# Patient Record
Sex: FEMALE | HISPANIC OR LATINO | Employment: UNEMPLOYED | RURAL
[De-identification: names, ages, dates, MRNs, and addresses within clinical notes are randomized per-mention and may not be internally consistent; named-entity substitution may affect disease eponyms.]

---

## 2021-02-13 PROBLEM — Z76.0 MEDICATION REFILL: Status: ACTIVE | Noted: 2021-02-13

## 2021-02-13 PROBLEM — J98.9 RESPIRATORY ILLNESS: Status: ACTIVE | Noted: 2021-02-13

## 2021-02-13 PROBLEM — G43.109 MIGRAINE EQUIVALENT: Status: ACTIVE | Noted: 2021-02-13

## 2021-02-13 PROCEDURE — 87109 MYCOPLASMA: CPT | Performed by: NURSE PRACTITIONER

## 2021-02-13 PROCEDURE — 87635 SARS-COV-2 COVID-19 AMP PRB: CPT | Performed by: NURSE PRACTITIONER

## 2021-02-18 ENCOUNTER — TELEPHONE (OUTPATIENT)
Dept: FAMILY MEDICINE CLINIC | Facility: CLINIC | Age: 41
End: 2021-02-18

## 2021-02-18 NOTE — TELEPHONE ENCOUNTER
CALLED TO CANCEL/R/S DUE TO WEATHER PLUS CANT DO NEW PATIENT APPTS OVER THE PHONE..CALLED    NA/NVM  HUB TO READ

## 2021-02-22 ENCOUNTER — TRANSCRIBE ORDERS (OUTPATIENT)
Dept: URGENT CARE | Facility: CLINIC | Age: 41
End: 2021-02-22

## 2021-02-22 DIAGNOSIS — N34.1 NONGONOCOCCAL URETHRITIS DUE TO UREAPLASMA UREALYTICUM: Primary | ICD-10-CM

## 2021-02-22 DIAGNOSIS — A49.3 NONGONOCOCCAL URETHRITIS DUE TO UREAPLASMA UREALYTICUM: Primary | ICD-10-CM

## 2021-02-22 RX ORDER — AZITHROMYCIN 500 MG/1
1000 TABLET, FILM COATED ORAL ONCE
Qty: 2 TABLET | Refills: 0 | Status: SHIPPED | OUTPATIENT
Start: 2021-02-22 | End: 2021-02-22

## 2021-03-03 ENCOUNTER — OFFICE VISIT (OUTPATIENT)
Dept: FAMILY MEDICINE CLINIC | Facility: CLINIC | Age: 41
End: 2021-03-03

## 2021-03-03 VITALS — WEIGHT: 125.6 LBS | TEMPERATURE: 98 F | BODY MASS INDEX: 21.44 KG/M2 | HEIGHT: 64 IN

## 2021-03-03 DIAGNOSIS — M54.2 CHRONIC NECK PAIN: ICD-10-CM

## 2021-03-03 DIAGNOSIS — Z12.31 SCREENING MAMMOGRAM, ENCOUNTER FOR: ICD-10-CM

## 2021-03-03 DIAGNOSIS — Z00.00 GENERAL MEDICAL EXAMINATION: ICD-10-CM

## 2021-03-03 DIAGNOSIS — Z72.0 TOBACCO USER: ICD-10-CM

## 2021-03-03 DIAGNOSIS — Z13.6 ENCOUNTER FOR SCREENING FOR CARDIOVASCULAR DISORDERS: ICD-10-CM

## 2021-03-03 DIAGNOSIS — G89.29 CHRONIC MIDLINE BACK PAIN, UNSPECIFIED BACK LOCATION: ICD-10-CM

## 2021-03-03 DIAGNOSIS — M25.511 RIGHT SHOULDER PAIN, UNSPECIFIED CHRONICITY: Primary | ICD-10-CM

## 2021-03-03 DIAGNOSIS — F10.10 ALCOHOL ABUSE: ICD-10-CM

## 2021-03-03 DIAGNOSIS — Z13.1 ENCOUNTER FOR SCREENING FOR DIABETES MELLITUS: ICD-10-CM

## 2021-03-03 DIAGNOSIS — Z00.00 ANNUAL PHYSICAL EXAM: ICD-10-CM

## 2021-03-03 DIAGNOSIS — Z01.419 ENCOUNTER FOR CERVICAL PAP SMEAR WITH PELVIC EXAM: ICD-10-CM

## 2021-03-03 DIAGNOSIS — M54.9 CHRONIC MIDLINE BACK PAIN, UNSPECIFIED BACK LOCATION: ICD-10-CM

## 2021-03-03 DIAGNOSIS — E03.9 HYPOTHYROIDISM, UNSPECIFIED TYPE: ICD-10-CM

## 2021-03-03 DIAGNOSIS — G89.29 CHRONIC NECK PAIN: ICD-10-CM

## 2021-03-03 DIAGNOSIS — G43.109 MIGRAINE EQUIVALENT: ICD-10-CM

## 2021-03-03 DIAGNOSIS — Z71.6 TOBACCO ABUSE COUNSELING: ICD-10-CM

## 2021-03-03 PROCEDURE — 99204 OFFICE O/P NEW MOD 45 MIN: CPT | Performed by: FAMILY MEDICINE

## 2021-03-03 RX ORDER — TOPIRAMATE 25 MG/1
25 TABLET ORAL 2 TIMES DAILY
Qty: 60 TABLET | Refills: 3 | Status: SHIPPED | OUTPATIENT
Start: 2021-03-03 | End: 2021-03-31 | Stop reason: SDUPTHER

## 2021-03-03 RX ORDER — NICOTINE 21 MG/24HR
1 PATCH, TRANSDERMAL 24 HOURS TRANSDERMAL EVERY 24 HOURS
Qty: 30 PATCH | Refills: 3 | Status: SHIPPED | OUTPATIENT
Start: 2021-03-03 | End: 2021-03-31 | Stop reason: SDUPTHER

## 2021-03-03 RX ORDER — MELOXICAM 15 MG/1
15 TABLET ORAL DAILY
Qty: 30 TABLET | Refills: 3 | Status: SHIPPED | OUTPATIENT
Start: 2021-03-03 | End: 2021-03-31 | Stop reason: SDUPTHER

## 2021-03-03 RX ORDER — CYCLOBENZAPRINE HCL 5 MG
5 TABLET ORAL 3 TIMES DAILY PRN
Qty: 30 TABLET | Refills: 3 | Status: SHIPPED | OUTPATIENT
Start: 2021-03-03 | End: 2021-03-31 | Stop reason: SDUPTHER

## 2021-03-03 RX ORDER — BUTALBITAL, ACETAMINOPHEN AND CAFFEINE 50; 325; 40 MG/1; MG/1; MG/1
1 TABLET ORAL EVERY 6 HOURS PRN
Qty: 10 TABLET | Refills: 0 | Status: SHIPPED | OUTPATIENT
Start: 2021-03-03 | End: 2021-03-31 | Stop reason: SDUPTHER

## 2021-03-03 RX ORDER — KETOROLAC TROMETHAMINE 10 MG/1
10 TABLET, FILM COATED ORAL EVERY 6 HOURS PRN
Qty: 30 TABLET | Refills: 3 | Status: SHIPPED | OUTPATIENT
Start: 2021-03-03 | End: 2021-03-03

## 2021-03-03 NOTE — PATIENT INSTRUCTIONS
New medications    Stop toradol     Start on mobic 15 mg daily for headaches and back and neck pain  Flexeril 5 mg every  8 hours for back and neck pain  For headaches topamax 25 mg twice a day to prevent headaches  fioricet on onset of headaches    Get labwork and x-rays downstairs down elevator to the right    Stop by physical therapy near front door to schedule appt with back and neck    Recheck in 4 weeks       Topiramate tablets  What is this medicine?  TOPIRAMATE (toe PYRE a mate) is used to treat seizures in adults or children with epilepsy. It is also used for the prevention of migraine headaches.  This medicine may be used for other purposes; ask your health care provider or pharmacist if you have questions.  COMMON BRAND NAME(S): Topamax, Topiragen  What should I tell my health care provider before I take this medicine?  They need to know if you have any of these conditions:  · bleeding disorders  · kidney disease  · lung or breathing disease, like asthma  · suicidal thoughts, plans, or attempt; a previous suicide attempt by you or a family member  · an unusual or allergic reaction to topiramate, other medicines, foods, dyes, or preservatives  · pregnant or trying to get pregnant  · breast-feeding  How should I use this medicine?  Take this medicine by mouth with a glass of water. Follow the directions on the prescription label. Do not cut, crush or chew this medicine. Swallow the tablets whole. You can take it with or without food. If it upsets your stomach, take it with food. Take your medicine at regular intervals. Do not take it more often than directed. Do not stop taking except on your doctor's advice.  A special MedGuide will be given to you by the pharmacist with each prescription and refill. Be sure to read this information carefully each time.  Talk to your pediatrician regarding the use of this medicine in children. While this drug may be prescribed for children as young as 2 years of age for  selected conditions, precautions do apply.  Overdosage: If you think you have taken too much of this medicine contact a poison control center or emergency room at once.  NOTE: This medicine is only for you. Do not share this medicine with others.  What if I miss a dose?  If you miss a dose, take it as soon as you can. If your next dose is to be taken in less than 6 hours, then do not take the missed dose. Take the next dose at your regular time. Do not take double or extra doses.  What may interact with this medicine?  This medicine may interact with the following medications:  · acetazolamide  · alcohol  · antihistamines for allergy, cough, and cold  · aspirin and aspirin-like medicines  · atropine  · birth control pills  · certain medicines for anxiety or sleep  · certain medicines for bladder problems like oxybutynin, tolterodine  · certain medicines for depression like amitriptyline, fluoxetine, sertraline  · certain medicines for seizures like carbamazepine, phenobarbital, phenytoin, primidone, valproic acid, zonisamide  · certain medicines for stomach problems like dicyclomine, hyoscyamine  · certain medicines for travel sickness like scopolamine  · certain medicines for Parkinson's disease like benztropine, trihexyphenidyl  · certain medicines that treat or prevent blood clots like warfarin, enoxaparin, dalteparin, apixaban, dabigatran, and rivaroxaban  · digoxin  · general anesthetics like halothane, isoflurane, methoxyflurane, propofol  · hydrochlorothiazide  · ipratropium  · lithium  · medicines that relax muscles for surgery  · metformin  · narcotic medicines for pain  · NSAIDs, medicines for pain and inflammation, like ibuprofen or naproxen  · phenothiazines like chlorpromazine, mesoridazine, prochlorperazine, thioridazine  · pioglitazone  This list may not describe all possible interactions. Give your health care provider a list of all the medicines, herbs, non-prescription drugs, or dietary supplements  you use. Also tell them if you smoke, drink alcohol, or use illegal drugs. Some items may interact with your medicine.  What should I watch for while using this medicine?  Visit your doctor or health care professional for regular checks on your progress. Tell your health care professional if your symptoms do not start to get better or if they get worse.  Do not stop taking except on your health care professional's advice. You may develop a severe reaction. Your health care professional will tell you how much medicine to take.  Wear a medical ID bracelet or chain. Carry a card that describes your disease and details of your medicine and dosage times.  This medicine can reduce the response of your body to heat or cold. Dress warm in cold weather and stay hydrated in hot weather. If possible, avoid extreme temperatures like saunas, hot tubs, very hot or cold showers, or activities that can cause dehydration such as vigorous exercise.  Check with your health care professional if you have severe diarrhea, nausea, and vomiting, or if you sweat a lot. The loss of too much body fluid may make it dangerous for you to take this medicine.  You may get drowsy or dizzy. Do not drive, use machinery, or do anything that needs mental alertness until you know how this medicine affects you. Do not stand up or sit up quickly, especially if you are an older patient. This reduces the risk of dizzy or fainting spells. Alcohol may interfere with the effect of this medicine. Avoid alcoholic drinks.  Tell your health care professional right away if you have any change in your eyesight.  Patients and their families should watch out for new or worsening depression or thoughts of suicide. Also watch out for sudden changes in feelings such as feeling anxious, agitated, panicky, irritable, hostile, aggressive, impulsive, severely restless, overly excited and hyperactive, or not being able to sleep. If this happens, especially at the beginning of  treatment or after a change in dose, call your healthcare professional.  This medicine may cause serious skin reactions. They can happen weeks to months after starting the medicine. Contact your health care provider right away if you notice fevers or flu-like symptoms with a rash. The rash may be red or purple and then turn into blisters or peeling of the skin. Or, you might notice a red rash with swelling of the face, lips or lymph nodes in your neck or under your arms.  Birth control may not work properly while you are taking this medicine. Talk to your health care professional about using an extra method of birth control.  Women should inform their health care professional if they wish to become pregnant or think they might be pregnant. There is a potential for serious side effects and harm to an unborn child. Talk to your health care professional for more information.  What side effects may I notice from receiving this medicine?  Side effects that you should report to your doctor or health care professional as soon as possible:  · allergic reactions like skin rash, itching or hives, swelling of the face, lips, or tongue  · blood in the urine  · changes in vision  · confusion  · loss of memory  · pain in lower back or side  · pain when urinating  · redness, blistering, peeling or loosening of the skin, including inside the mouth  · signs and symptoms of bleeding such as bloody or black, tarry stools; red or dark brown urine; spitting up blood or brown material that looks like coffee grounds; red spots on the skin; unusual bruising or bleeding from the eyes, gums, or nose  · signs and symptoms of increased acid in the body like breathing fast; fast heartbeat; headache; confusion; unusually weak or tired; nausea, vomiting  · suicidal thoughts, mood changes  · trouble speaking or understanding  · unusual sweating  · unusually weak or tired  Side effects that usually do not require medical attention (report to your  doctor or health care professional if they continue or are bothersome):  · dizziness  · drowsiness  · fever  · loss of appetite  · nausea, vomiting  · pain, tingling, numbness in the hands or feet  · stomach pain  · tiredness  · upset stomach  This list may not describe all possible side effects. Call your doctor for medical advice about side effects. You may report side effects to FDA at 7-823-FSB-0739.  Where should I keep my medicine?  Keep out of the reach of children.  Store at room temperature between 15 and 30 degrees C (59 and 86 degrees F). Throw away any unused medicine after the expiration date.  NOTE: This sheet is a summary. It may not cover all possible information. If you have questions about this medicine, talk to your doctor, pharmacist, or health care provider.  © 2021 [employer] Standard (2020-07-16 15:07:20)  Butalbital; Acetaminophen; Caffeine; Codeine Capsules  What is this medicine?  BUTALBITAL; ACETAMINOPHEN; CAFFEINE; CODEINE (byfrannie rodriguez giovanni; a set a DOUGLAS angie fen; AARON een; CARLOS urvashi) is a pain reliever. It is used to treat tension headaches.  This medicine may be used for other purposes; ask your health care provider or pharmacist if you have questions.  COMMON BRAND NAME(S): Fioricet with Codeine, Phrenilin with Caffeine and Codeine  What should I tell my health care provider before I take this medicine?  They need to know if you have any of these conditions:  · breathing problems  · drug abuse or addiction  · if you often drink alcohol  · liver disease  · intestinal problem  · porphyria  · recent head injury  · an unusual or allergic reaction to acetaminophen, butalbital or other barbiturates, caffeine, codeine, other medicines, foods, dyes, or preservatives  · pregnant or trying to get pregnant  · breast-feeding  How should I use this medicine?  Take this medicine by mouth with a full glass of water. Follow the directions on the prescription label. Do not take your medicine more often  than directed.  A special MedGuide will be given to you by the pharmacist with each prescription and refill. Be sure to read this information carefully each time.  Talk to your pediatrician regarding the use of this medicine in children. This medicine is not for use in children less than 12 years of age. Do not give this medicine to a child younger than 18 years of age after surgery to remove the tonsils and/or adenoids.  Patients over 65 years old may have a stronger reaction and need a smaller dose.  Overdosage: If you think you have taken too much of this medicine contact a poison control center or emergency room at once.  NOTE: This medicine is only for you. Do not share this medicine with others.  What if I miss a dose?  If you miss a dose, take it as soon as you can. If it is almost time for your next dose, take only that dose. Do not take double or extra doses.  What may interact with this medicine?  Do not take this medicine with any of the following medications:  · praziquantel  · voriconazole  This medicine may also interact with the following medications:  · alcohol  · antihistamines for allergy, cough and cold  · certain medicines for anxiety or sleep  · certain medicines for bladder problems like oxybutynin, tolterodine  · certain medicines for depression like amitriptyline, fluoxetine, sertraline  · certain medicines for Parkinson's disease like benztropine, trihexyphenidyl  · certain medicines for seizures like phenobarbital, primidone  · certain medicines for stomach problems like dicyclomine, hyoscyamine  · certain medicines for travel sickness like scopolamine  · general anesthetics like halothane, isoflurane, methoxyflurane, propofol  · ipratropium  · linezolid  · local anesthetics like lidocaine, pramoxine, tetracaine  · MAOIs like Carbex, Eldepryl, Marplan, Nardil, and Parnate  · medicines that relax muscles for surgery  · other medicines with acetaminophen  · other narcotic medicines for pain  or cough  · phenothiazines like chlorpromazine, mesoridazine, prochlorperazine, thioridazine  · procarbazine  This list may not describe all possible interactions. Give your health care provider a list of all the medicines, herbs, non-prescription drugs, or dietary supplements you use. Also tell them if you smoke, drink alcohol, or use illegal drugs. Some items may interact with your medicine.  What should I watch for while using this medicine?  Tell your health care provider if your pain does not go away, if it gets worse, or if you have new or a different type of pain. You may develop tolerance to this drug. Tolerance means that you will need a higher dose of the drug for pain relief. Tolerance is normal and is expected if you take this drug for a long time.  There are different types of narcotic drugs (opioids) for pain. If you take more than one type at the same time, you may have more side effects. Give your health care provider a list of all drugs you use. He or she will tell you how much drug to take. Do not take more drug than directed. Get emergency help right away if you have problems breathing.  Do not suddenly stop taking your drug because you may develop a severe reaction. Your body becomes used to the drug. This does NOT mean you are addicted. Addiction is a behavior related to getting and using a drug for a nonmedical reason. If you have pain, you have a medical reason to take pain drug. Your health care provider will tell you how much drug to take. If your health care provider wants you to stop the drug, the dose will be slowly lowered over time to avoid any side effects.  Talk to your health care provider about naloxone and how to get it. Naloxone is an emergency drug used for an opioid overdose. An overdose can happen if you take too much opioid. It can also happen if an opioid is taken with some other drugs or substances, like alcohol. Know the symptoms of an overdose, like trouble breathing,  unusually tired or sleepy, or not being able to respond or wake up. Make sure to tell caregivers and close contacts where it is stored. Make sure they know how to use it. After naloxone is given, you must get emergency help right away. Naloxone is a temporary treatment. Repeat doses may be needed.  Children may be at higher risk for side effects. If your child has slow breathing, noisy breathing, confusion, or unusual sleepiness, stop giving this drug and get emergency help right away.  Do not take other drugs that contain acetaminophen with this drug. Many non-prescription drugs contain acetaminophen. Always read labels carefully. If you have questions, ask your health care provider.  If you take too much acetaminophen, get medical help right away. Too much acetaminophen can be very dangerous and cause liver damage. Even if you do not have symptoms, it is important to get help right away.  You may get drowsy or dizzy. Do not drive, use machinery, or do anything that needs mental alertness until you know how this drug affects you. Do not stand up or sit up quickly, especially if you are an older patient. This reduces the risk of dizzy or fainting spells. Alcohol may interfere with the effect of this drug. Avoid alcoholic drinks.  This drug will cause constipation. If you do not have a bowel movement for 3 days, call your health care provider.  Your mouth may get dry. Chewing sugarless gum or sucking hard candy and drinking plenty of water may help. Contact your health care provider if the problem does not go away or is severe.  What side effects may I notice from receiving this medicine?  Side effects that you should report to your doctor or health care professional as soon as possible:  · allergic reactions like skin rash, itching or hives, swelling of the face, lips, or tongue  · anxious  · breathing problems  · confusion  · fast, irregular heartbeat  · redness, blistering, peeling or loosening of the skin,  including inside the mouth  · seizures  · signs and symptoms of low blood pressure like dizziness; feeling faint or lightheaded, falls; unusually weak or tired  · trouble passing urine or change in the amount of urine  · yellowing of the eyes or skin  Side effects that usually do not require medical attention (report to your doctor or health care professional if they continue or are bothersome):  · constipation  · dry mouth  · nausea, vomiting  · tiredness  This list may not describe all possible side effects. Call your doctor for medical advice about side effects. You may report side effects to FDA at 5-049-IKD-8310.  Where should I keep my medicine?  Keep out of the reach of children. This medicine can be abused. Keep your medicine in a safe place to protect it from theft. Do not share this medicine with anyone. Selling or giving away this medicine is dangerous and against the law.  This medicine may cause accidental overdose and death if it taken by other adults, children, or pets. Mix any unused medicine with a substance like cat litter or coffee grounds. Then throw the medicine away in a sealed container like a sealed bag or a coffee can with a lid. Do not use the medicine after the expiration date.  Store at room temperature between 15 and 30 degrees C (59 and 86 degrees F).  NOTE: This sheet is a summary. It may not cover all possible information. If you have questions about this medicine, talk to your doctor, pharmacist, or health care provider.  © 2021 [employer]/Gold Standard (2020-07-30 15:09:07)

## 2021-03-04 ENCOUNTER — TELEPHONE (OUTPATIENT)
Dept: FAMILY MEDICINE CLINIC | Facility: CLINIC | Age: 41
End: 2021-03-04

## 2021-03-04 NOTE — TELEPHONE ENCOUNTER
----- Message from Rashad Rondon MD sent at 3/3/2021  6:48 PM CST -----  Please let pt know she has mild levoscoliosis or curvature of lumbar spine that may be causing lower back pain    Has inward curve of lumbar spine that may be causing back pain as well    Continue with PT/OT and medications provided    Recheck on next visit. Thanks

## 2021-03-04 NOTE — TELEPHONE ENCOUNTER
----- Message from Rashad Rondon MD sent at 3/3/2021  6:46 PM CST -----  Please let pt know she has degenerative disc disease along with spondylotic changes or shifting  of cervical spine that may be causing her neck pain in addition pt has cervical lordosis or straightening of neck due to muscle spasms    Proceed with PT/OT and medications provided     Recheck on next visit. Thanks

## 2021-03-04 NOTE — TELEPHONE ENCOUNTER
----- Message from Rashad Rondon MD sent at 3/3/2021  6:44 PM CST -----  Please let pt know she has normal right shoulder x-ray. She does have scoliosis or curvature of thoracic spine that may be causing her middle back pian    Recheck on next visit. Thanks

## 2021-03-04 NOTE — TELEPHONE ENCOUNTER
----- Message from Rashad Rondon MD sent at 3/3/2021  6:49 PM CST -----  Please let pt know x-ray of lumbar and thoracic spine shows mild scoliosis or curvature that may be contributing to pt's lower back pain    Proceed with PT/OT     Recheck on next visit. Thanks

## 2021-03-15 NOTE — PROGRESS NOTES
Subjective:  Lester Lozano is a 41 y.o. female who presents for       Patient Active Problem List   Diagnosis   • Medication refill   • Migraine equivalent   • Respiratory illness   • Hypothyroidism   • Tobacco abuse counseling   • Tobacco user   • Thoracogenic scoliosis of thoracic region   • Chronic midline thoracic back pain   • Scoliosis of lumbar spine   • Chronic midline low back pain with bilateral sciatica   • Neck pain, chronic   • Migraine   • DDD (degenerative disc disease), cervical   • Restless leg syndrome   • Chronic midline back pain   • Chronic neck pain           Current Outpatient Medications:   •  butalbital-acetaminophen-caffeine (Esgic) -40 MG per tablet, Take 1 tablet by mouth Every 6 (Six) Hours As Needed for Headache or Migraine., Disp: 10 tablet, Rfl: 0  •  cyclobenzaprine (FLEXERIL) 5 MG tablet, Take 1 tablet by mouth 3 (Three) Times a Day As Needed for Muscle Spasms., Disp: 30 tablet, Rfl: 3  •  levothyroxine (SYNTHROID, LEVOTHROID) 125 MCG tablet, Take 1 tablet by mouth Daily., Disp: 30 tablet, Rfl: 3  •  meloxicam (Mobic) 15 MG tablet, Take 1 tablet by mouth Daily., Disp: 30 tablet, Rfl: 3  •  nicotine (Nicoderm CQ) 21 MG/24HR patch, Place 1 patch on the skin as directed by provider Daily., Disp: 30 patch, Rfl: 3  •  topiramate (Topamax) 25 MG tablet, Take 1 tablet by mouth 2 (Two) Times a Day., Disp: 60 tablet, Rfl: 3  •  rOPINIRole (Requip) 0.5 MG tablet, Take 1 tablet by mouth Every Night. Take 1 hour before bedtime., Disp: 30 tablet, Rfl: 3    Pt is 40 yo female with management of hypothyroidism, tobacco user, migraines, chronic back pain (scoliosis of lumbar, thoracic spine), chronic neck pain (DDD cervical spine, spondylosis)insomnia     3/3/21  pt is here to [place].  She is from [place] and has lived in [place] prior to moving back her to live closer to mom who is having health issues..  She has a CMH of hypothyroidism and is on synthroid 100 mcg PO q  artur. . pt was recently seen at   For URI n 2/13/21 and had COVID 19 test that was negative along with influenza test negative. She was given keflex. Pt does take toradol for migraine headaches.  She was diagnosed with hypothyroidism while she was pregnant. She has 3 biological children and 1 passed away.   She is  and is single. She is trying to get work. She smoke tobaco about 1 ppd and wants to quit. No alcohol use   No illicit drug use. She currently lives with mother and stepfather. She also has had no major surgeries.. 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 She has pain in her right scapula as well. She has been having neck and back pain for years and was taking toradol but it is getting progressively worse. She also takes fioricet for migraine headaches and it is all over. She has 5 headaches about a month. She has tried imitrex that does not help. She also has right shoulder pain. She feels like she has her shoulder come of socket.     3/31/21 in office visit for recheck on pt's above medical issues. Pt has yet to get labwork ordered on 3/3/21. Pt on last visit was having issues of lower and middle back pain along with neck pain.  X-ray of thoracic spine on 3/3/21 showed minimal levoscoliosis of upper thoracic spine and minimal dextroscoliosis of lower thoracic spine.  Along with mild degenerative changes. X-ray of lumbar spine showed mild levoscoliosis with congenitally short pedicles.  X-ray of cervical spine showed straigetning of cervical lordosis and DDD of cervical spine at C5-C6.  X-ray of normal right shoulder. She continues to take her medications for her back pain, migraine headaches and hypothyroidism. She also has restless legs at bedtime that affect her sleep     Hypothyroidism  The current episode started more than 1 year ago. The problem has been unchanged. Associated symptoms include arthralgias, fatigue, neck pain, numbness and weakness. Pertinent negatives  include no abdominal pain, anorexia, chest pain, chills, congestion, coughing, diaphoresis, fever, headaches, nausea, sore throat, swollen glands, visual change or vomiting. Nothing aggravates the symptoms. She has tried nothing (synthroid ) for the symptoms. The treatment provided no relief.   Headache   This is a chronic problem. The current episode started more than 1 year ago. The problem occurs constantly. The problem has been unchanged. The pain is at a severity of 4/10. The patient is experiencing no pain. Associated symptoms include back pain, neck pain, numbness, scalp tenderness and weakness. Pertinent negatives include no abdominal pain, abnormal behavior, anorexia, blurred vision, coughing, dizziness, drainage, ear pain, facial sweating, fever, hearing loss, insomnia, loss of balance, muscle aches, nausea, photophobia, rhinorrhea, seizures, sinus pressure, sore throat, swollen glands, tingling, tinnitus, visual change or vomiting. Nothing aggravates the symptoms. Treatments tried: toradol    Neck Pain   This is a chronic problem. The current episode started more than 1 year ago. The problem occurs constantly. The problem has been unchanged. The pain is associated with nothing. The pain is present in the anterior neck, left side and midline. The quality of the pain is described as aching. The pain is at a severity of 0/10. The patient is experiencing no pain. Nothing aggravates the symptoms. The pain is worse during the night. Stiffness is present all day. Associated symptoms include numbness and weakness. Pertinent negatives include no chest pain, fever, headaches, pain with swallowing, paresis, photophobia, tingling, trouble swallowing or visual change. The treatment provided no relief.   Back Pain  This is a chronic problem. The current episode started more than 1 year ago. The problem occurs constantly. The pain is present in the lumbar spine, sacro-iliac and thoracic spine. The quality of the pain is  described as aching and burning. The pain is at a severity of 5/10. The pain is moderate. The pain is the same all the time. The symptoms are aggravated by bending, lying down, position and standing. Stiffness is present all day. Associated symptoms include numbness and weakness. Pertinent negatives include no abdominal pain, chest pain, fever, headaches, paresis or tingling. She has tried nothing for the symptoms. The treatment provided no relief.   Shoulder Injury   The incident occurred at the gym. The right shoulder is affected. There was no injury mechanism. The quality of the pain is described as aching, burning and cramping. The pain does not radiate. Associated symptoms include numbness. Pertinent negatives include no chest pain or tingling. The symptoms are aggravated by movement and overhead lifting. She has tried nothing for the symptoms. The treatment provided no relief.      Review of Systems  Review of Systems   Constitutional: Positive for activity change and fatigue. Negative for appetite change, chills, diaphoresis and fever.   HENT: Negative for congestion, ear pain, hearing loss, postnasal drip, rhinorrhea, sinus pressure, sinus pain, sneezing, sore throat, tinnitus, trouble swallowing and voice change.    Eyes: Negative for blurred vision and photophobia.   Respiratory: Negative for cough, choking, chest tightness, shortness of breath, wheezing and stridor.    Cardiovascular: Negative for chest pain.   Gastrointestinal: Negative for abdominal pain, anorexia, diarrhea, nausea and vomiting.   Musculoskeletal: Positive for arthralgias, back pain, neck pain and neck stiffness.   Neurological: Positive for weakness and numbness. Negative for dizziness, tingling, seizures, headaches and loss of balance.   Psychiatric/Behavioral: Positive for sleep disturbance. The patient does not have insomnia.        Patient Active Problem List   Diagnosis   • Medication refill   • Migraine equivalent   • Respiratory  illness   • Hypothyroidism   • Tobacco abuse counseling   • Tobacco user   • Thoracogenic scoliosis of thoracic region   • Chronic midline thoracic back pain   • Scoliosis of lumbar spine   • Chronic midline low back pain with bilateral sciatica   • Neck pain, chronic   • Migraine   • DDD (degenerative disc disease), cervical   • Restless leg syndrome   • Chronic midline back pain   • Chronic neck pain     History reviewed. No pertinent surgical history.  Social History     Socioeconomic History   • Marital status:      Spouse name: Not on file   • Number of children: Not on file   • Years of education: Not on file   • Highest education level: Not on file   Tobacco Use   • Smoking status: Current Every Day Smoker   • Smokeless tobacco: Never Used   Substance and Sexual Activity   • Alcohol use: Not Currently   • Drug use: Not Currently   • Sexual activity: Defer     History reviewed. No pertinent family history.  Lab on 03/22/2021   Component Date Value Ref Range Status   • WBC 03/22/2021 6.61  3.40 - 10.80 10*3/mm3 Final   • RBC 03/22/2021 4.41  3.77 - 5.28 10*6/mm3 Final   • Hemoglobin 03/22/2021 12.5  12.0 - 15.9 g/dL Final   • Hematocrit 03/22/2021 38.2  34.0 - 46.6 % Final   • MCV 03/22/2021 86.6  79.0 - 97.0 fL Final   • MCH 03/22/2021 28.3  26.6 - 33.0 pg Final   • MCHC 03/22/2021 32.7  31.5 - 35.7 g/dL Final   • RDW 03/22/2021 13.4  12.3 - 15.4 % Final   • RDW-SD 03/22/2021 41.7  37.0 - 54.0 fl Final   • MPV 03/22/2021 10.1  6.0 - 12.0 fL Final   • Platelets 03/22/2021 410  140 - 450 10*3/mm3 Final   • Neutrophil % 03/22/2021 49.0  42.7 - 76.0 % Final   • Lymphocyte % 03/22/2021 43.6  19.6 - 45.3 % Final   • Monocyte % 03/22/2021 6.5  5.0 - 12.0 % Final   • Eosinophil % 03/22/2021 0.0* 0.3 - 6.2 % Final   • Basophil % 03/22/2021 0.6  0.0 - 1.5 % Final   • Immature Grans % 03/22/2021 0.3  0.0 - 0.5 % Final   • Neutrophils, Absolute 03/22/2021 3.24  1.70 - 7.00 10*3/mm3 Final   • Lymphocytes, Absolute  03/22/2021 2.88  0.70 - 3.10 10*3/mm3 Final   • Monocytes, Absolute 03/22/2021 0.43  0.10 - 0.90 10*3/mm3 Final   • Eosinophils, Absolute 03/22/2021 0.00  0.00 - 0.40 10*3/mm3 Final   • Basophils, Absolute 03/22/2021 0.04  0.00 - 0.20 10*3/mm3 Final   • Immature Grans, Absolute 03/22/2021 0.02  0.00 - 0.05 10*3/mm3 Final   • nRBC 03/22/2021 0.0  0.0 - 0.2 /100 WBC Final   • Glucose 03/22/2021 93  65 - 99 mg/dL Final   • BUN 03/22/2021 12  6 - 20 mg/dL Final   • Creatinine 03/22/2021 0.61  0.57 - 1.00 mg/dL Final   • Sodium 03/22/2021 141  136 - 145 mmol/L Final   • Potassium 03/22/2021 3.9  3.5 - 5.2 mmol/L Final   • Chloride 03/22/2021 106  98 - 107 mmol/L Final   • CO2 03/22/2021 25.7  22.0 - 29.0 mmol/L Final   • Calcium 03/22/2021 8.7  8.6 - 10.5 mg/dL Final   • Total Protein 03/22/2021 7.3  6.0 - 8.5 g/dL Final   • Albumin 03/22/2021 4.20  3.50 - 5.20 g/dL Final   • ALT (SGPT) 03/22/2021 14  1 - 33 U/L Final   • AST (SGOT) 03/22/2021 19  1 - 32 U/L Final   • Alkaline Phosphatase 03/22/2021 48  39 - 117 U/L Final   • Total Bilirubin 03/22/2021 0.2  0.0 - 1.2 mg/dL Final   • eGFR Non African Amer 03/22/2021 109  >60 mL/min/1.73 Final   • eGFR   Amer 03/22/2021 132  >60 mL/min/1.73 Final   • Globulin 03/22/2021 3.1  gm/dL Final   • A/G Ratio 03/22/2021 1.4  g/dL Final   • BUN/Creatinine Ratio 03/22/2021 19.7  7.0 - 25.0 Final   • Anion Gap 03/22/2021 9.3  5.0 - 15.0 mmol/L Final   • Hemoglobin A1C 03/22/2021 5.51  4.80 - 5.60 % Final   • Total Cholesterol 03/22/2021 141  0 - 200 mg/dL Final   • Triglycerides 03/22/2021 99  0 - 150 mg/dL Final   • HDL Cholesterol 03/22/2021 68* 40 - 60 mg/dL Final   • LDL Cholesterol  03/22/2021 55  0 - 100 mg/dL Final   • VLDL Cholesterol 03/22/2021 18  5 - 40 mg/dL Final   • LDL/HDL Ratio 03/22/2021 0.78   Final   • TSH 03/22/2021 21.600* 0.270 - 4.200 uIU/mL Final   • Free T4 03/22/2021 0.75* 0.93 - 1.70 ng/dL Final   • 25 Hydroxy, Vitamin D 03/22/2021 46.9  30.0 - 100.0  ng/ml Final   • Hepatitis C Ab 03/22/2021 Non-Reactive  Non-Reactive Final   • Thyroid Peroxidase Antibody 03/22/2021 >600* 0 - 34 IU/mL Final   • Thyroid Stimulating Immunoglobulin 03/22/2021 <0.10  0.00 - 0.55 IU/L Final   • T3, Total 03/22/2021 81.5  80.0 - 200.0 ng/dl Final   Admission on 02/13/2021, Discharged on 02/13/2021   Component Date Value Ref Range Status   • Color 02/13/2021 Yellow  Yellow, Straw, Dark Yellow, Va Final   • Clarity, UA 02/13/2021 Clear  Clear Final   • Glucose, UA 02/13/2021 Negative  Negative, 1000 mg/dL (3+) mg/dL Final   • Bilirubin 02/13/2021 Negative  Negative Final   • Ketones, UA 02/13/2021 Negative  Negative Final   • Specific Gravity  02/13/2021 1.015  1.005 - 1.030 Final   • Blood, UA 02/13/2021 Negative  Negative Final   • pH, Urine 02/13/2021 7.5  5.0 - 8.0 Final   • Protein, POC 02/13/2021 Negative  Negative mg/dL Final   • Urobilinogen, UA 02/13/2021 4 E.U./dL * Normal Final   • Nitrite, UA 02/13/2021 Negative  Negative Final   • Leukocytes 02/13/2021 Negative  Negative Final   • WBC, UA 02/13/2021 None Seen  None Seen /HPF Final   • RBC, UA 02/13/2021 None Seen  None Seen /HPF Final   • Bacteria, UA 02/13/2021 None Seen  None Seen /HPF Final   • Rapid Influenza A Ag 02/13/2021 Negative  Negative Final   • Rapid Influenza B Ag 02/13/2021 Negative  Negative Final   • Internal Control 02/13/2021 Passed  Passed Final   • Lot Number 02/13/2021 [id number]   Final   • Expiration Date 02/13/2021 5/7/2022   Final   • COVID19 02/13/2021 Not Detected  Not Detected - Ref. Range Final   • Ureaplasma urealyticum 02/13/2021 Positive* Negative Final   • Mycoplasma hominis 02/13/2021 Negative  Negative Final      XR Spine Lumbar Complete 4+VW  Narrative: Five-view lumbar spine    HISTORY: Back pain. Chronic pain.    AP, lateral and oblique radiographs of the lumbar spine and spot  film of the lumbosacral junction were obtained.    COMPARISON: None.    FINDINGS:   Accentuation of the lumbar  lordosis.   Mild levoscoliosis lumbar spine.  Congenitally short pedicles.  Vertebral height, disc spaces and alignment are maintained.  No fracture.  The pedicles are intact.    Tubal ligation.  Moderate amount retained feces in the colon.  Impression: CONCLUSION:  Accentuation of the lumbar lordosis.   Mild levoscoliosis lumbar spine.  Congenitally short pedicles.    [id number]    Electronically signed by:  Israel Marie MD  3/3/2021 6:00 PM CST  Workstation: [id number]  XR Spine Thoracic 3 View  Narrative: Three-view thoracic spine.    HISTORY: Back pain. Chronic pain.    AP, lateral and swimmer's views of the thoracic spine were  obtained.    COMPARISON: None.    FINDINGS:   Normal thoracic kyphosis.  Minimal levoscoliosis upper thoracic spine and minimal  dextroscoliosis lower thoracic spine.  Mild degenerative changes.  Vertebral height and alignment maintained  No fracture.  The pedicles are intact.  No paraspinal widening.  Impression: CONCLUSION:   Minimal levoscoliosis upper thoracic spine and minimal  dextroscoliosis lower thoracic spine.  Mild degenerative changes.    [id number]    Electronically signed by:  Israel Marie MD  3/3/2021 5:52 PM CST  Workstation: [id number] [email or web address] [email or web address] Complete 4 or 5 View  Narrative: Five-view cervical spine    HISTORY: Neck pain. Chronic pain.    AP, odontoid and lateral views of the cervical spine were  obtained.    FINDINGS:    Straightening of the cervical lordosis.  Degenerative disc disease and spondylotic change C5-6.  Vertebral height and alignment are maintained.  No fracture.    Prevertebral soft tissues unremarkable.  Impression: CONCLUSION:  FINDINGS:    Straightening of the cervical lordosis.  Degenerative disc disease and spondylotic change C5-6.    [id number]    Electronically signed by:  Israel Marie MD  3/3/2021 4:11 PM CST  Workstation: [id number]  XR Shoulder 2+ View Right  Narrative: Three view right shoulder    HISTORY: Shoulder pain    AP films  "with the humerus in internal and external rotation and  scapular Y view were obtained.    COMPARISON: None    FINDINGS:   No fracture or dislocation.  Scoliosis thoracic spine.  No other osseous or articular abnormality.  Impression: CONCLUSION:  Normal right shoulder.  Scoliosis thoracic spine.    [id number]    Electronically signed by:  Israel Marie MD  3/3/2021 4:07 PM CST  Workstation: [id number]    [unfilled]    There is no immunization history on file for this patient.    The following portions of the patient's history were reviewed and updated as appropriate: allergies, current medications, past family history, past medical history, past social history, past surgical history and problem list.        Physical Exam  /62 (BP Location: Right arm, Patient Position: Sitting, Cuff Size: Large Adult)   Temp 97.2 °F (36.2 °C)   Ht 162.6 cm (64\")   Wt 56.8 kg (125 lb 3.2 oz)   LMP 03/08/2021   BMI 21.49 kg/m²     Physical Exam  Vitals and nursing note reviewed.   Constitutional:       Appearance: She is well-developed. She is not diaphoretic.   HENT:      Head: Normocephalic and atraumatic.      Right Ear: External ear normal.   Eyes:      Conjunctiva/sclera: Conjunctivae normal.      Pupils: Pupils are equal, round, and reactive to light.   Cardiovascular:      Rate and Rhythm: Normal rate and regular rhythm.      Heart sounds: Normal heart sounds. No murmur heard.     Pulmonary:      Effort: Pulmonary effort is normal. No respiratory distress.      Breath sounds: Normal breath sounds.   Abdominal:      General: Bowel sounds are normal. There is no distension.      Palpations: Abdomen is soft.      Tenderness: There is no abdominal tenderness.   Musculoskeletal:         General: Tenderness present. No deformity.      Right shoulder: Tenderness and bony tenderness present. Decreased range of motion.        Arms:       Cervical back: Rigidity, spasms, tenderness and bony tenderness present. Decreased range " of motion.      Thoracic back: Spasms, tenderness and bony tenderness present. Decreased range of motion.      Lumbar back: Spasms, tenderness and bony tenderness present. Decreased range of motion.   Skin:     General: Skin is warm.      Coloration: Skin is not pale.      Findings: No erythema or rash.   Neurological:      Mental Status: She is alert and oriented to person, place, and time.      Cranial Nerves: No cranial nerve deficit.   Psychiatric:         Behavior: Behavior normal.         Assessment/Plan    Diagnosis Plan   1. Tobacco user     2. Hypothyroidism due to Hashimoto's thyroiditis     3. Chronic midline low back pain with bilateral sciatica     4. Scoliosis of lumbar spine, unspecified scoliosis type     5. Chronic midline thoracic back pain     6. Thoracogenic scoliosis of thoracic region     7. Neck pain, chronic     8. DDD (degenerative disc disease), cervical     9. Other migraine with status migrainosus, not intractable     10. Migraine equivalent  butalbital-acetaminophen-caffeine (Esgic) -40 MG per tablet   11. Chronic neck pain  butalbital-acetaminophen-caffeine (Esgic) -40 MG per tablet   12. Chronic midline back pain, unspecified back location  butalbital-acetaminophen-caffeine (Esgic) -40 MG per tablet   13. Restless leg syndrome          -went over labwork   -recommend mammogram screening - schedule at imaging center  -recommend pap smear - refer to [facility]   -recommend pneumonia/tdap vaccination  -recommend influenza vaccination  -restless legs- start on requip 0.5 mg at bedtime   -right shoulder pain - x-ray of right shoulder normal  - refer to PT/OT   -back and neck pain/scoliosis of thoracic and lumbar spine/DDD cervical neck  - reviewed x-ray of back and neck ic -  Refer to PT/OT. No on mobic 15 mg daily along with flexeril 5 mg pO TID PRN . She will need to set up an appt.  Consider Neurosurgery   -tobacco user - counseled quit smoking >5 minutes recommend  1 800  QUIT NOW.  Nicotine patches start on nicotine patches.    -Hashimoto's hypothyroidism - went up on synthroid from 125 to 150 m  -migraine headaches - on topmax 25 mg PO BID drug information provided. Start on fioricet  As needed for headaches also start on imitrex as needed   -advised pt to be safe and call with questions and concerns  -adivsed pt to go to ER or call 911 if symptoms worrisome or severe  -advised pt to followup with specialist and referrals  -advised pt to be safe during COVID-19 pandemic  I spent 30  minutes caring for Lester on this date of service. This time includes time spent by me in the following activities: preparing for the visit, reviewing tests, obtaining and/or reviewing a separately obtained history, performing a medically appropriate examination and/or evaluation, counseling and educating the patient/family/caregiver, ordering medications, tests, or procedures, referring and communicating with other health care professionals, documenting information in the medical record and care coordination.   -recheck in 6 weeks         This document has been electronically signed by Rashad Rondon MD on March 31, 2021 16:12 CDT

## 2021-03-16 ENCOUNTER — TELEPHONE (OUTPATIENT)
Dept: FAMILY MEDICINE CLINIC | Facility: CLINIC | Age: 41
End: 2021-03-16

## 2021-03-16 DIAGNOSIS — Z12.31 SCREENING MAMMOGRAM, ENCOUNTER FOR: ICD-10-CM

## 2021-03-16 NOTE — TELEPHONE ENCOUNTER
----- Message from Rashad Rondon MD sent at 3/16/2021  9:31 AM CDT -----  Please let pt know mammogram screening normal    Repeat in 1 year    Pt has dense breast tissue that lowers sensitivity    Recheck on next visit. Thanks

## 2021-03-22 ENCOUNTER — LAB (OUTPATIENT)
Dept: LAB | Facility: HOSPITAL | Age: 41
End: 2021-03-22

## 2021-03-22 DIAGNOSIS — E03.9 HYPOTHYROIDISM, UNSPECIFIED TYPE: ICD-10-CM

## 2021-03-22 DIAGNOSIS — G43.109 MIGRAINE EQUIVALENT: ICD-10-CM

## 2021-03-22 DIAGNOSIS — Z13.1 ENCOUNTER FOR SCREENING FOR DIABETES MELLITUS: ICD-10-CM

## 2021-03-22 DIAGNOSIS — Z00.00 GENERAL MEDICAL EXAMINATION: ICD-10-CM

## 2021-03-22 DIAGNOSIS — Z00.00 ANNUAL PHYSICAL EXAM: ICD-10-CM

## 2021-03-22 DIAGNOSIS — Z13.6 ENCOUNTER FOR SCREENING FOR CARDIOVASCULAR DISORDERS: ICD-10-CM

## 2021-03-22 LAB
25(OH)D3 SERPL-MCNC: 46.9 NG/ML (ref 30–100)
ALBUMIN SERPL-MCNC: 4.2 G/DL (ref 3.5–5.2)
ALBUMIN/GLOB SERPL: 1.4 G/DL
ALP SERPL-CCNC: 48 U/L (ref 39–117)
ALT SERPL W P-5'-P-CCNC: 14 U/L (ref 1–33)
ANION GAP SERPL CALCULATED.3IONS-SCNC: 9.3 MMOL/L (ref 5–15)
AST SERPL-CCNC: 19 U/L (ref 1–32)
BASOPHILS # BLD AUTO: 0.04 10*3/MM3 (ref 0–0.2)
BASOPHILS NFR BLD AUTO: 0.6 % (ref 0–1.5)
BILIRUB SERPL-MCNC: 0.2 MG/DL (ref 0–1.2)
BUN SERPL-MCNC: 12 MG/DL (ref 6–20)
BUN/CREAT SERPL: 19.7 (ref 7–25)
CALCIUM SPEC-SCNC: 8.7 MG/DL (ref 8.6–10.5)
CHLORIDE SERPL-SCNC: 106 MMOL/L (ref 98–107)
CHOLEST SERPL-MCNC: 141 MG/DL (ref 0–200)
CO2 SERPL-SCNC: 25.7 MMOL/L (ref 22–29)
CREAT SERPL-MCNC: 0.61 MG/DL (ref 0.57–1)
DEPRECATED RDW RBC AUTO: 41.7 FL (ref 37–54)
EOSINOPHIL # BLD AUTO: 0 10*3/MM3 (ref 0–0.4)
EOSINOPHIL NFR BLD AUTO: 0 % (ref 0.3–6.2)
ERYTHROCYTE [DISTWIDTH] IN BLOOD BY AUTOMATED COUNT: 13.4 % (ref 12.3–15.4)
GFR SERPL CREATININE-BSD FRML MDRD: 109 ML/MIN/1.73
GFR SERPL CREATININE-BSD FRML MDRD: 132 ML/MIN/1.73
GLOBULIN UR ELPH-MCNC: 3.1 GM/DL
GLUCOSE SERPL-MCNC: 93 MG/DL (ref 65–99)
HBA1C MFR BLD: 5.51 % (ref 4.8–5.6)
HCT VFR BLD AUTO: 38.2 % (ref 34–46.6)
HCV AB SER DONR QL: NORMAL
HDLC SERPL-MCNC: 68 MG/DL (ref 40–60)
HGB BLD-MCNC: 12.5 G/DL (ref 12–15.9)
IMM GRANULOCYTES # BLD AUTO: 0.02 10*3/MM3 (ref 0–0.05)
IMM GRANULOCYTES NFR BLD AUTO: 0.3 % (ref 0–0.5)
LDLC SERPL CALC-MCNC: 55 MG/DL (ref 0–100)
LDLC/HDLC SERPL: 0.78 {RATIO}
LYMPHOCYTES # BLD AUTO: 2.88 10*3/MM3 (ref 0.7–3.1)
LYMPHOCYTES NFR BLD AUTO: 43.6 % (ref 19.6–45.3)
MCH RBC QN AUTO: 28.3 PG (ref 26.6–33)
MCHC RBC AUTO-ENTMCNC: 32.7 G/DL (ref 31.5–35.7)
MCV RBC AUTO: 86.6 FL (ref 79–97)
MONOCYTES # BLD AUTO: 0.43 10*3/MM3 (ref 0.1–0.9)
MONOCYTES NFR BLD AUTO: 6.5 % (ref 5–12)
NEUTROPHILS NFR BLD AUTO: 3.24 10*3/MM3 (ref 1.7–7)
NEUTROPHILS NFR BLD AUTO: 49 % (ref 42.7–76)
NRBC BLD AUTO-RTO: 0 /100 WBC (ref 0–0.2)
PLATELET # BLD AUTO: 410 10*3/MM3 (ref 140–450)
PMV BLD AUTO: 10.1 FL (ref 6–12)
POTASSIUM SERPL-SCNC: 3.9 MMOL/L (ref 3.5–5.2)
PROT SERPL-MCNC: 7.3 G/DL (ref 6–8.5)
RBC # BLD AUTO: 4.41 10*6/MM3 (ref 3.77–5.28)
SODIUM SERPL-SCNC: 141 MMOL/L (ref 136–145)
T3 SERPL-MCNC: 81.5 NG/DL (ref 80–200)
T4 FREE SERPL-MCNC: 0.75 NG/DL (ref 0.93–1.7)
TRIGL SERPL-MCNC: 99 MG/DL (ref 0–150)
TSH SERPL DL<=0.05 MIU/L-ACNC: 21.6 UIU/ML (ref 0.27–4.2)
VLDLC SERPL-MCNC: 18 MG/DL (ref 5–40)
WBC # BLD AUTO: 6.61 10*3/MM3 (ref 3.4–10.8)

## 2021-03-22 PROCEDURE — 84439 ASSAY OF FREE THYROXINE: CPT

## 2021-03-22 PROCEDURE — 86803 HEPATITIS C AB TEST: CPT

## 2021-03-22 PROCEDURE — 82306 VITAMIN D 25 HYDROXY: CPT

## 2021-03-22 PROCEDURE — 84480 ASSAY TRIIODOTHYRONINE (T3): CPT

## 2021-03-22 PROCEDURE — 85025 COMPLETE CBC W/AUTO DIFF WBC: CPT

## 2021-03-22 PROCEDURE — 83036 HEMOGLOBIN GLYCOSYLATED A1C: CPT

## 2021-03-22 PROCEDURE — 84443 ASSAY THYROID STIM HORMONE: CPT

## 2021-03-22 PROCEDURE — 86376 MICROSOMAL ANTIBODY EACH: CPT

## 2021-03-22 PROCEDURE — 80053 COMPREHEN METABOLIC PANEL: CPT

## 2021-03-22 PROCEDURE — 84445 ASSAY OF TSI GLOBULIN: CPT

## 2021-03-22 PROCEDURE — 80061 LIPID PANEL: CPT

## 2021-03-23 ENCOUNTER — TELEPHONE (OUTPATIENT)
Dept: FAMILY MEDICINE CLINIC | Facility: CLINIC | Age: 41
End: 2021-03-23

## 2021-03-23 LAB — THYROPEROXIDASE AB SERPL-ACNC: >600 IU/ML (ref 0–34)

## 2021-03-23 RX ORDER — LEVOTHYROXINE SODIUM 0.12 MG/1
125 TABLET ORAL DAILY
Qty: 30 TABLET | Refills: 3 | Status: SHIPPED | OUTPATIENT
Start: 2021-03-23 | End: 2021-03-31 | Stop reason: SDUPTHER

## 2021-03-23 NOTE — TELEPHONE ENCOUNTER
Gave pt results and recommendations. Pt voiced understanding and is agreeable to increase in synthroid.

## 2021-03-23 NOTE — TELEPHONE ENCOUNTER
----- Message from Rashad Rondon MD sent at 3/23/2021  7:57 AM CDT -----  Please call pt    On thyroid studies she has positive antibodies or thyroid peroxidase antibodies that suggest Hashimoto's thyroiditis which is causing her hypothyroidism.  Continue with synthroid but TSH is high and T4 low and go up on synthroid from 100 to 125 mcg PO q daily give 30 pills and 3 refills     On CMP kidney and liver function stable     Lipid panel stable     Hga1c normal pt is not prediabetic or diabetic    Vitamin D levels normal    Hep C antibody test negative     CBC shows normal hemoglobin    Recheck on next visit. Thanks

## 2021-03-24 LAB — TSI SER-ACNC: <0.1 IU/L (ref 0–0.55)

## 2021-03-31 ENCOUNTER — OFFICE VISIT (OUTPATIENT)
Dept: FAMILY MEDICINE CLINIC | Facility: CLINIC | Age: 41
End: 2021-03-31

## 2021-03-31 VITALS
SYSTOLIC BLOOD PRESSURE: 108 MMHG | HEIGHT: 64 IN | TEMPERATURE: 97.2 F | BODY MASS INDEX: 21.37 KG/M2 | WEIGHT: 125.2 LBS | DIASTOLIC BLOOD PRESSURE: 62 MMHG

## 2021-03-31 DIAGNOSIS — E03.8 HYPOTHYROIDISM DUE TO HASHIMOTO'S THYROIDITIS: ICD-10-CM

## 2021-03-31 DIAGNOSIS — G89.29 CHRONIC MIDLINE THORACIC BACK PAIN: ICD-10-CM

## 2021-03-31 DIAGNOSIS — M54.6 CHRONIC MIDLINE THORACIC BACK PAIN: ICD-10-CM

## 2021-03-31 DIAGNOSIS — G89.29 CHRONIC NECK PAIN: ICD-10-CM

## 2021-03-31 DIAGNOSIS — G89.29 CHRONIC MIDLINE LOW BACK PAIN WITH BILATERAL SCIATICA: ICD-10-CM

## 2021-03-31 DIAGNOSIS — E06.3 HYPOTHYROIDISM DUE TO HASHIMOTO'S THYROIDITIS: ICD-10-CM

## 2021-03-31 DIAGNOSIS — G43.109 MIGRAINE EQUIVALENT: ICD-10-CM

## 2021-03-31 DIAGNOSIS — M41.34 THORACOGENIC SCOLIOSIS OF THORACIC REGION: ICD-10-CM

## 2021-03-31 DIAGNOSIS — M54.41 CHRONIC MIDLINE LOW BACK PAIN WITH BILATERAL SCIATICA: ICD-10-CM

## 2021-03-31 DIAGNOSIS — G25.81 RESTLESS LEG SYNDROME: ICD-10-CM

## 2021-03-31 DIAGNOSIS — G43.801 OTHER MIGRAINE WITH STATUS MIGRAINOSUS, NOT INTRACTABLE: ICD-10-CM

## 2021-03-31 DIAGNOSIS — M41.9 SCOLIOSIS OF LUMBAR SPINE, UNSPECIFIED SCOLIOSIS TYPE: ICD-10-CM

## 2021-03-31 DIAGNOSIS — G89.29 CHRONIC MIDLINE BACK PAIN, UNSPECIFIED BACK LOCATION: ICD-10-CM

## 2021-03-31 DIAGNOSIS — M54.2 CHRONIC NECK PAIN: ICD-10-CM

## 2021-03-31 DIAGNOSIS — M54.9 CHRONIC MIDLINE BACK PAIN, UNSPECIFIED BACK LOCATION: ICD-10-CM

## 2021-03-31 DIAGNOSIS — Z72.0 TOBACCO USER: Primary | ICD-10-CM

## 2021-03-31 DIAGNOSIS — M54.2 NECK PAIN, CHRONIC: ICD-10-CM

## 2021-03-31 DIAGNOSIS — G89.29 NECK PAIN, CHRONIC: ICD-10-CM

## 2021-03-31 DIAGNOSIS — M50.30 DDD (DEGENERATIVE DISC DISEASE), CERVICAL: ICD-10-CM

## 2021-03-31 DIAGNOSIS — M54.42 CHRONIC MIDLINE LOW BACK PAIN WITH BILATERAL SCIATICA: ICD-10-CM

## 2021-03-31 PROBLEM — G43.909 MIGRAINE: Status: ACTIVE | Noted: 2021-03-31

## 2021-03-31 PROCEDURE — 99214 OFFICE O/P EST MOD 30 MIN: CPT | Performed by: FAMILY MEDICINE

## 2021-03-31 RX ORDER — ROPINIROLE 0.5 MG/1
0.5 TABLET, FILM COATED ORAL NIGHTLY
Qty: 30 TABLET | Refills: 3 | Status: SHIPPED | OUTPATIENT
Start: 2021-03-31 | End: 2021-09-26 | Stop reason: SDUPTHER

## 2021-03-31 RX ORDER — BUTALBITAL, ACETAMINOPHEN AND CAFFEINE 50; 325; 40 MG/1; MG/1; MG/1
1 TABLET ORAL EVERY 6 HOURS PRN
Qty: 10 TABLET | Refills: 0 | Status: SHIPPED | OUTPATIENT
Start: 2021-03-31 | End: 2021-09-23 | Stop reason: SDUPTHER

## 2021-03-31 RX ORDER — LEVOTHYROXINE SODIUM 0.12 MG/1
125 TABLET ORAL DAILY
Qty: 30 TABLET | Refills: 3 | Status: SHIPPED | OUTPATIENT
Start: 2021-03-31 | End: 2021-09-23 | Stop reason: SDUPTHER

## 2021-03-31 RX ORDER — SUMATRIPTAN 25 MG/1
TABLET, FILM COATED ORAL
Status: CANCELLED | OUTPATIENT
Start: 2021-03-31

## 2021-03-31 RX ORDER — NICOTINE 21 MG/24HR
1 PATCH, TRANSDERMAL 24 HOURS TRANSDERMAL EVERY 24 HOURS
Qty: 30 PATCH | Refills: 3 | Status: SHIPPED | OUTPATIENT
Start: 2021-03-31 | End: 2021-09-26 | Stop reason: SDUPTHER

## 2021-03-31 RX ORDER — MELOXICAM 15 MG/1
15 TABLET ORAL DAILY
Qty: 30 TABLET | Refills: 3 | Status: SHIPPED | OUTPATIENT
Start: 2021-03-31 | End: 2021-09-26 | Stop reason: SDUPTHER

## 2021-03-31 RX ORDER — CYCLOBENZAPRINE HCL 5 MG
5 TABLET ORAL 3 TIMES DAILY PRN
Qty: 30 TABLET | Refills: 3 | Status: SHIPPED | OUTPATIENT
Start: 2021-03-31 | End: 2021-09-26 | Stop reason: SDUPTHER

## 2021-03-31 RX ORDER — DOXEPIN HYDROCHLORIDE 25 MG/1
25 CAPSULE ORAL NIGHTLY
Qty: 30 CAPSULE | Refills: 3 | Status: SHIPPED | OUTPATIENT
Start: 2021-03-31 | End: 2021-03-31

## 2021-03-31 RX ORDER — TOPIRAMATE 25 MG/1
25 TABLET ORAL 2 TIMES DAILY
Qty: 60 TABLET | Refills: 3 | Status: SHIPPED | OUTPATIENT
Start: 2021-03-31 | End: 2021-09-23 | Stop reason: SDUPTHER

## 2021-03-31 NOTE — PATIENT INSTRUCTIONS
Go to physical therapy downstairs     medications     Scoliosis    Scoliosis is a condition in which the spine curves sideways. Normally, the spine does not curve side-to-side (laterally). With scoliosis, the spine may curve to the left, to the right, or in both directions. The curve of the spine is measured by angles in degrees.  Scoliosis can affect people at any age, but it is more common among children and adolescents.  What are the causes?  The cause of scoliosis is not always known. It may be caused by:  · A birth defect.  · A disease that can cause problems in the muscles or imbalance of the body, such as cerebral palsy or muscular dystrophy.  What are the signs or symptoms?  This condition may not cause any symptoms. If you do have symptoms, they may include:  · Leaning to one side.  · Sunken chest and uneven shoulders.  · One side of the body being different or larger than the other side (asymmetry).  · An abnormal curve in the back.  · Pain, which may limit physical activity.  · Shortness of breath.  · Bowel or bladder control problems, such as not knowing when you have to go. This can be a sign of nerve damage.  How is this diagnosed?  This condition is diagnosed based on:  · Your medical history.  · Your symptoms.  · A physical exam. This may include:  ? Examining your nerves, muscles, and reflexes (neurological exam).  ? Testing the movement of your spine (range of motion study).  · Imaging tests, such as:  ? X-rays.  ? MRI.  How is this treated?  Treatment for this condition depends on the severity of the symptoms. Treatment may include:  · Observation to make sure that your scoliosis does not get worse (progress). You may need to have regular visits with your health care provider.  · A back brace to prevent scoliosis from progressing. This may be needed during times of fast growth (growth spurts), such as during adolescence.  · Medicine to help relieve pain.  · Physical  therapy.  · Surgery.  Follow these instructions at home:  If you have a brace:  · Wear the brace as told by your health care provider. Remove it only as told by your health care provider.  · Loosen the brace if your fingers or toes tingle, become numb, or turn cold and blue.  · Keep the brace clean.  · If the brace is not waterproof:  ? Do not let it get wet.  ? Cover it with a watertight covering when you take a bath or a shower.  General instructions  · Take over-the-counter and prescription medicines only as told by your health care provider.  · Do not drive or use heavy machinery while taking prescription pain medicine.  · If physical therapy was prescribed, do exercises as instructed.  · Before starting any new sports or physical activities, ask your health care provider whether they are safe for you.  · Keep all follow-up visits as told by your health care provider. This is important.  Contact a health care provider if you have:  · Problems with your back brace, such as skin irritation or discomfort.  · Back pain that does not get better with medicine.  Get help right away if:  · Your legs feel weak.  · You cannot move your legs.  · You cannot control when you urinate or pass stool (loss of bladder or bowel control).  Summary  · Scoliosis is a condition of having a spine that curves sideways. The spine may curve to the left, to the right, or in both directions.  · This condition may be caused by birth defects or diseases that affect muscles and body balance.  · Follow your health care provider's instructions about wearing a brace, doing physical activities, and keeping follow-up visits.  This information is not intended to replace advice given to you by your health care provider. Make sure you discuss any questions you have with your health care provider.  Document Revised: 05/20/2019 Document Reviewed: 04/04/2019  [employer] Patient Education © 2021 [employer]    Hypothyroidism    Hypothyroidism is when the  thyroid gland does not make enough of certain hormones (it is underactive). The thyroid gland is a small gland located in the lower front part of the neck, just in front of the windpipe (trachea). This gland makes hormones that help control how the body uses food for energy (metabolism) as well as how the heart and brain function. These hormones also play a role in keeping your bones strong. When the thyroid is underactive, it produces too little of the hormones thyroxine (T4) and triiodothyronine (T3).  What are the causes?  This condition may be caused by:  · Hashimoto's disease. This is a disease in which the body's disease-fighting system (immune system) attacks the thyroid gland. This is the most common cause.  · Viral infections.  · Pregnancy.  · Certain medicines.  · Birth defects.  · Past radiation treatments to the head or neck for cancer.  · Past treatment with radioactive iodine.  · Past exposure to radiation in the environment.  · Past surgical removal of part or all of the thyroid.  · Problems with a gland in the center of the brain (pituitary gland).  · Lack of enough iodine in the diet.  What increases the risk?  You are more likely to develop this condition if:  · You are female.  · You have a family history of thyroid conditions.  · You use a medicine called lithium.  · You take medicines that affect the immune system (immunosuppressants).  What are the signs or symptoms?  Symptoms of this condition include:  · Feeling as though you have no energy (lethargy).  · Not being able to tolerate cold.  · Weight gain that is not explained by a change in diet or exercise habits.  · Lack of appetite.  · Dry skin.  · Coarse hair.  · Menstrual irregularity.  · Slowing of thought processes.  · Constipation.  · Sadness or depression.  How is this diagnosed?  This condition may be diagnosed based on:  · Your symptoms, your medical history, and a physical exam.  · Blood tests.  You may also have imaging tests, such  as an ultrasound or MRI.  How is this treated?  This condition is treated with medicine that replaces the thyroid hormones that your body does not make. After you begin treatment, it may take several weeks for symptoms to go away.  Follow these instructions at home:  · Take over-the-counter and prescription medicines only as told by your health care provider.  · If you start taking any new medicines, tell your health care provider.  · Keep all follow-up visits as told by your health care provider. This is important.  ? As your condition improves, your dosage of thyroid hormone medicine may change.  ? You will need to have blood tests regularly so that your health care provider can monitor your condition.  Contact a health care provider if:  · Your symptoms do not get better with treatment.  · You are taking thyroid replacement medicine and you:  ? Sweat a lot.  ? Have tremors.  ? Feel anxious.  ? Lose weight rapidly.  ? Cannot tolerate heat.  ? Have emotional swings.  ? Have diarrhea.  ? Feel weak.  Get help right away if you have:  · Chest pain.  · An irregular heartbeat.  · A rapid heartbeat.  · Difficulty breathing.  Summary  · Hypothyroidism is when the thyroid gland does not make enough of certain hormones (it is underactive).  · When the thyroid is underactive, it produces too little of the hormones thyroxine (T4) and triiodothyronine (T3).  · The most common cause is Hashimoto's disease, a disease in which the body's disease-fighting system (immune system) attacks the thyroid gland. The condition can also be caused by viral infections, medicine, pregnancy, or past radiation treatment to the head or neck.  · Symptoms may include weight gain, dry skin, constipation, feeling as though you do not have energy, and not being able to tolerate cold.  · This condition is treated with medicine to replace the thyroid hormones that your body does not make.  This information is not intended to replace advice given to you  by your health care provider. Make sure you discuss any questions you have with your health care provider.  Document Revised: 11/30/2018 Document Reviewed: 11/28/2018  [employer] Patient Education © 2021 [employer]    Hypothyroidism    Hypothyroidism is when the thyroid gland does not make enough of certain hormones (it is underactive). The thyroid gland is a small gland located in the lower front part of the neck, just in front of the windpipe (trachea). This gland makes hormones that help control how the body uses food for energy (metabolism) as well as how the heart and brain function. These hormones also play a role in keeping your bones strong. When the thyroid is underactive, it produces too little of the hormones thyroxine (T4) and triiodothyronine (T3).  What are the causes?  This condition may be caused by:  · Hashimoto's disease. This is a disease in which the body's disease-fighting system (immune system) attacks the thyroid gland. This is the most common cause.  · Viral infections.  · Pregnancy.  · Certain medicines.  · Birth defects.  · Past radiation treatments to the head or neck for cancer.  · Past treatment with radioactive iodine.  · Past exposure to radiation in the environment.  · Past surgical removal of part or all of the thyroid.  · Problems with a gland in the center of the brain (pituitary gland).  · Lack of enough iodine in the diet.  What increases the risk?  You are more likely to develop this condition if:  · You are female.  · You have a family history of thyroid conditions.  · You use a medicine called lithium.  · You take medicines that affect the immune system (immunosuppressants).  What are the signs or symptoms?  Symptoms of this condition include:  · Feeling as though you have no energy (lethargy).  · Not being able to tolerate cold.  · Weight gain that is not explained by a change in diet or exercise habits.  · Lack of appetite.  · Dry skin.  · Coarse hair.  · Menstrual  irregularity.  · Slowing of thought processes.  · Constipation.  · Sadness or depression.  How is this diagnosed?  This condition may be diagnosed based on:  · Your symptoms, your medical history, and a physical exam.  · Blood tests.  You may also have imaging tests, such as an ultrasound or MRI.  How is this treated?  This condition is treated with medicine that replaces the thyroid hormones that your body does not make. After you begin treatment, it may take several weeks for symptoms to go away.  Follow these instructions at home:  · Take over-the-counter and prescription medicines only as told by your health care provider.  · If you start taking any new medicines, tell your health care provider.  · Keep all follow-up visits as told by your health care provider. This is important.  ? As your condition improves, your dosage of thyroid hormone medicine may change.  ? You will need to have blood tests regularly so that your health care provider can monitor your condition.  Contact a health care provider if:  · Your symptoms do not get better with treatment.  · You are taking thyroid replacement medicine and you:  ? Sweat a lot.  ? Have tremors.  ? Feel anxious.  ? Lose weight rapidly.  ? Cannot tolerate heat.  ? Have emotional swings.  ? Have diarrhea.  ? Feel weak.  Get help right away if you have:  · Chest pain.  · An irregular heartbeat.  · A rapid heartbeat.  · Difficulty breathing.  Summary  · Hypothyroidism is when the thyroid gland does not make enough of certain hormones (it is underactive).  · When the thyroid is underactive, it produces too little of the hormones thyroxine (T4) and triiodothyronine (T3).  · The most common cause is Hashimoto's disease, a disease in which the body's disease-fighting system (immune system) attacks the thyroid gland. The condition can also be caused by viral infections, medicine, pregnancy, or past radiation treatment to the head or neck.  · Symptoms may include weight gain,  dry skin, constipation, feeling as though you do not have energy, and not being able to tolerate cold.  · This condition is treated with medicine to replace the thyroid hormones that your body does not make.  This information is not intended to replace advice given to you by your health care provider. Make sure you discuss any questions you have with your health care provider.  Document Revised: 11/30/2018 Document Reviewed: 11/28/2018  [employer] Patient Education © 2021 [employer]    Scoliosis    Scoliosis is a condition in which the spine curves sideways. Normally, the spine does not curve side-to-side (laterally). With scoliosis, the spine may curve to the left, to the right, or in both directions. The curve of the spine is measured by angles in degrees.  Scoliosis can affect people at any age, but it is more common among children and adolescents.  What are the causes?  The cause of scoliosis is not always known. It may be caused by:  · A birth defect.  · A disease that can cause problems in the muscles or imbalance of the body, such as cerebral palsy or muscular dystrophy.  What are the signs or symptoms?  This condition may not cause any symptoms. If you do have symptoms, they may include:  · Leaning to one side.  · Sunken chest and uneven shoulders.  · One side of the body being different or larger than the other side (asymmetry).  · An abnormal curve in the back.  · Pain, which may limit physical activity.  · Shortness of breath.  · Bowel or bladder control problems, such as not knowing when you have to go. This can be a sign of nerve damage.  How is this diagnosed?  This condition is diagnosed based on:  · Your medical history.  · Your symptoms.  · A physical exam. This may include:  ? Examining your nerves, muscles, and reflexes (neurological exam).  ? Testing the movement of your spine (range of motion study).  · Imaging tests, such as:  ? X-rays.  ? MRI.  How is this treated?  Treatment for this  condition depends on the severity of the symptoms. Treatment may include:  · Observation to make sure that your scoliosis does not get worse (progress). You may need to have regular visits with your health care provider.  · A back brace to prevent scoliosis from progressing. This may be needed during times of fast growth (growth spurts), such as during adolescence.  · Medicine to help relieve pain.  · Physical therapy.  · Surgery.  Follow these instructions at home:  If you have a brace:  · Wear the brace as told by your health care provider. Remove it only as told by your health care provider.  · Loosen the brace if your fingers or toes tingle, become numb, or turn cold and blue.  · Keep the brace clean.  · If the brace is not waterproof:  ? Do not let it get wet.  ? Cover it with a watertight covering when you take a bath or a shower.  General instructions  · Take over-the-counter and prescription medicines only as told by your health care provider.  · Do not drive or use heavy machinery while taking prescription pain medicine.  · If physical therapy was prescribed, do exercises as instructed.  · Before starting any new sports or physical activities, ask your health care provider whether they are safe for you.  · Keep all follow-up visits as told by your health care provider. This is important.  Contact a health care provider if you have:  · Problems with your back brace, such as skin irritation or discomfort.  · Back pain that does not get better with medicine.  Get help right away if:  · Your legs feel weak.  · You cannot move your legs.  · You cannot control when you urinate or pass stool (loss of bladder or bowel control).  Summary  · Scoliosis is a condition of having a spine that curves sideways. The spine may curve to the left, to the right, or in both directions.  · This condition may be caused by birth defects or diseases that affect muscles and body balance.  · Follow your health care provider's  instructions about wearing a brace, doing physical activities, and keeping follow-up visits.  This information is not intended to replace advice given to you by your health care provider. Make sure you discuss any questions you have with your health care provider.  Document Revised: 05/20/2019 Document Reviewed: 04/04/2019  [employer] Patient Education © 2021 [employer]    Hypothyroidism    Hypothyroidism is when the thyroid gland does not make enough of certain hormones (it is underactive). The thyroid gland is a small gland located in the lower front part of the neck, just in front of the windpipe (trachea). This gland makes hormones that help control how the body uses food for energy (metabolism) as well as how the heart and brain function. These hormones also play a role in keeping your bones strong. When the thyroid is underactive, it produces too little of the hormones thyroxine (T4) and triiodothyronine (T3).  What are the causes?  This condition may be caused by:  · Hashimoto's disease. This is a disease in which the body's disease-fighting system (immune system) attacks the thyroid gland. This is the most common cause.  · Viral infections.  · Pregnancy.  · Certain medicines.  · Birth defects.  · Past radiation treatments to the head or neck for cancer.  · Past treatment with radioactive iodine.  · Past exposure to radiation in the environment.  · Past surgical removal of part or all of the thyroid.  · Problems with a gland in the center of the brain (pituitary gland).  · Lack of enough iodine in the diet.  What increases the risk?  You are more likely to develop this condition if:  · You are female.  · You have a family history of thyroid conditions.  · You use a medicine called lithium.  · You take medicines that affect the immune system (immunosuppressants).  What are the signs or symptoms?  Symptoms of this condition include:  · Feeling as though you have no energy (lethargy).  · Not being able to  tolerate cold.  · Weight gain that is not explained by a change in diet or exercise habits.  · Lack of appetite.  · Dry skin.  · Coarse hair.  · Menstrual irregularity.  · Slowing of thought processes.  · Constipation.  · Sadness or depression.  How is this diagnosed?  This condition may be diagnosed based on:  · Your symptoms, your medical history, and a physical exam.  · Blood tests.  You may also have imaging tests, such as an ultrasound or MRI.  How is this treated?  This condition is treated with medicine that replaces the thyroid hormones that your body does not make. After you begin treatment, it may take several weeks for symptoms to go away.  Follow these instructions at home:  · Take over-the-counter and prescription medicines only as told by your health care provider.  · If you start taking any new medicines, tell your health care provider.  · Keep all follow-up visits as told by your health care provider. This is important.  ? As your condition improves, your dosage of thyroid hormone medicine may change.  ? You will need to have blood tests regularly so that your health care provider can monitor your condition.  Contact a health care provider if:  · Your symptoms do not get better with treatment.  · You are taking thyroid replacement medicine and you:  ? Sweat a lot.  ? Have tremors.  ? Feel anxious.  ? Lose weight rapidly.  ? Cannot tolerate heat.  ? Have emotional swings.  ? Have diarrhea.  ? Feel weak.  Get help right away if you have:  · Chest pain.  · An irregular heartbeat.  · A rapid heartbeat.  · Difficulty breathing.  Summary  · Hypothyroidism is when the thyroid gland does not make enough of certain hormones (it is underactive).  · When the thyroid is underactive, it produces too little of the hormones thyroxine (T4) and triiodothyronine (T3).  · The most common cause is Hashimoto's disease, a disease in which the body's disease-fighting system (immune system) attacks the thyroid gland. The  condition can also be caused by viral infections, medicine, pregnancy, or past radiation treatment to the head or neck.  · Symptoms may include weight gain, dry skin, constipation, feeling as though you do not have energy, and not being able to tolerate cold.  · This condition is treated with medicine to replace the thyroid hormones that your body does not make.  This information is not intended to replace advice given to you by your health care provider. Make sure you discuss any questions you have with your health care provider.  Document Revised: 11/30/2018 Document Reviewed: 11/28/2018  [employer] Patient Education © 2021 [employer]    Restless Legs Syndrome  Restless legs syndrome is a condition that causes uncomfortable feelings or sensations in the legs, especially while sitting or lying down. The sensations usually cause an overwhelming urge to move the legs. The arms can also sometimes be affected.  The condition can range from mild to severe. The symptoms often interfere with a person's ability to sleep.  What are the causes?  The cause of this condition is not known.  What increases the risk?  The following factors may make you more likely to develop this condition:  · Being older than 50.  · Pregnancy.  · Being a woman. In general, the condition is more common in women than in men.  · A family history of the condition.  · Having iron deficiency.  · Overuse of caffeine, nicotine, or alcohol.  · Certain medical conditions, such as kidney disease, Parkinson's disease, or nerve damage.  · Certain medicines, such as those for high blood pressure, nausea, colds, allergies, depression, and some heart conditions.  What are the signs or symptoms?  The main symptom of this condition is uncomfortable sensations in the legs, such as:  · Pulling.  · Tingling.  · Prickling.  · Throbbing.  · Crawling.  · Burning.  Usually, the sensations:  · Affect both sides of the body.  · Are worse when you sit or lie down.  · Are  worse at night. These may wake you up or make it difficult to fall asleep.  · Make you have a strong urge to move your legs.  · Are temporarily relieved by moving your legs.  The arms can also be affected, but this is rare. People who have this condition often have tiredness during the day because of their lack of sleep at night.  How is this diagnosed?  This condition may be diagnosed based on:  · Your symptoms.  · Blood tests.  In some cases, you may be monitored in a sleep lab by a specialist (a sleep study). This can detect any disruptions in your sleep.  How is this treated?  This condition is treated by managing the symptoms. This may include:  · Lifestyle changes, such as exercising, using relaxation techniques, and avoiding caffeine, alcohol, or tobacco.  · Medicines. Anti-seizure medicines may be tried first.  Follow these instructions at home:         General instructions  · Take over-the-counter and prescription medicines only as told by your health care provider.  · Use methods to help relieve the uncomfortable sensations, such as:  ? Massaging your legs.  ? Walking or stretching.  ? Taking a cold or hot bath.  · Keep all follow-up visits as told by your health care provider. This is important.  Lifestyle  · Practice good sleep habits. For example, go to bed and get up at the same time every day. Most adults should get 7-9 hours of sleep each night.  · Exercise regularly. Try to get at least 30 minutes of exercise most days of the week.  · Practice ways of relaxing, such as yoga or meditation.  · Avoid caffeine and alcohol.  · Do not use any products that contain nicotine or tobacco, such as cigarettes and e-cigarettes. If you need help quitting, ask your health care provider.  Contact a health care provider if:  · Your symptoms get worse or they do not improve with treatment.  Summary  · Restless legs syndrome is a condition that causes uncomfortable feelings or sensations in the legs, especially while  sitting or lying down.  · The symptoms often interfere with a person's ability to sleep.  · This condition is treated by managing the symptoms. You may need to make lifestyle changes or take medicines.  This information is not intended to replace advice given to you by your health care provider. Make sure you discuss any questions you have with your health care provider.  Document Revised: 01/07/2019 Document Reviewed: 01/07/2019  [employer] Patient Education © 2021 [employer]  Ropinirole tablets  What is this medicine?  ROPINIROLE (selam PIN i role) is used to treat the symptoms of Parkinson's disease. It helps to improve muscle control and movement difficulties. It is also used for the treatment of Restless Legs Syndrome.  This medicine may be used for other purposes; ask your health care provider or pharmacist if you have questions.  COMMON BRAND NAME(S): Requpablito  What should I tell my health care provider before I take this medicine?  They need to know if you have any of these conditions:  · heart disease  · high blood pressure  · kidney disease  · liver disease  · low blood pressure  · narcolepsy  · sleep apnea  · an unusual or allergic reaction to ropinirole, other medicines, foods, dyes, or preservatives  · pregnant or trying to get pregnant  · breast-feeding  How should I use this medicine?  Take this medicine by mouth with a glass of water. Follow the directions on the prescription label. You can take it with or without food. If it upsets your stomach, take it with food. Take your doses at regular intervals. Do not take your medicine more often than directed. Do not stop taking this medicine except on your doctor's advice. Stopping this medicine too quickly may cause serious side effects.  Talk to your pediatrician regarding the use of this medicine in children. Special care may be needed.  Overdosage: If you think you have taken too much of this medicine contact a poison control center or emergency room at  once.  NOTE: This medicine is only for you. Do not share this medicine with others.  What if I miss a dose?  If you miss a dose, take it as soon as you can. If it is almost time for your next dose, take only that dose. Do not take double or extra doses.  What may interact with this medicine?  · certain medicines for depression, mood, or psychotic disorders  · ciprofloxacin  · female hormones, like estrogens and birth control pills  · fluvoxamine  · metoclopramide  · mexiletine  · norfloxacin  · omeprazole  · rifampin  This list may not describe all possible interactions. Give your health care provider a list of all the medicines, herbs, non-prescription drugs, or dietary supplements you use. Also tell them if you smoke, drink alcohol, or use illegal drugs. Some items may interact with your medicine.  What should I watch for while using this medicine?  Visit your health care professional for regular checks on your progress. Tell your health care professional if your symptoms do not start to get better or if they get worse. Do not stop taking except on your health care professional's advice. You may develop a severe reaction. Your health care professional will tell you how much medicine to take.  You may get drowsy or dizzy. Do not drive, use machinery, or do anything that needs mental alertness until you know how this drug affects you. Do not stand or sit up quickly, especially if you are an older patient. This reduces the risk of dizzy or fainting spells. Alcohol may interfere with the effect of this medicine. Avoid alcoholic drinks.  When taking this medicine, you may fall asleep without notice. You may be doing activities like driving a car, talking, or eating. You may not feel drowsy before it happens. Contact your health care provider right away if this happens to you.  There have been reports of increased sexual urges or other strong urges such as gambling while taking this medicine. If you experience any of  these while taking this medicine, you should report this to your health care provider as soon as possible.  Your mouth may get dry. Chewing sugarless gum or sucking hard candy and drinking plenty of water may help. Contact your health care professional if the problem does not go away or is severe.  You should check your skin often for changes to moles and new growths while taking this medicine. Call your doctor if you notice any of these changes.  What side effects may I notice from receiving this medicine?  Side effects that you should report to your doctor or health care professional as soon as possible:  · allergic reactions like skin rash, itching or hives, swelling of the face, lips, or tongue  · breathing problems  · changes in emotions or moods  · changes in vision  · chest pain  · confusion  · falling asleep during normal activities like driving  · fast, irregular heartbeat  · hallucinations  · joint or muscle pain  · loss of bladder control  · loss of memory  · new or increased gambling urges, sexual urges, uncontrolled spending, binge or compulsive eating, or other urges  · pain, tingling, numbness in the hands or feet  · signs and symptoms of low blood pressure like dizziness; feeling faint or lightheaded, falls; unusually weak or tired  · swelling of the ankles, feet, hands  · uncontrollable movements of the arms, face, head, mouth, neck, or upper body  · vomiting  Side effects that usually do not require medical attention (report to your doctor or health care professional if they continue or are bothersome):  · dizziness  · drowsiness  · headache  · increased sweating  · nausea  This list may not describe all possible side effects. Call your doctor for medical advice about side effects. You may report side effects to FDA at 2-652-FDA-7455.  Where should I keep my medicine?  Keep out of the reach of children.  Store at room temperature between 20 and 25 degrees C (68 and 77 degrees F). Protect from light  and moisture. Keep container tightly closed. Throw away any unused medicine after the expiration date.  NOTE: This sheet is a summary. It may not cover all possible information. If you have questions about this medicine, talk to your doctor, pharmacist, or health care provider.  © 2021 [employer] (2020-08-20 16:52:05)

## 2021-09-23 DIAGNOSIS — M54.2 CHRONIC NECK PAIN: ICD-10-CM

## 2021-09-23 DIAGNOSIS — G43.109 MIGRAINE EQUIVALENT: ICD-10-CM

## 2021-09-23 DIAGNOSIS — G89.29 CHRONIC NECK PAIN: ICD-10-CM

## 2021-09-23 DIAGNOSIS — G89.29 CHRONIC MIDLINE BACK PAIN, UNSPECIFIED BACK LOCATION: ICD-10-CM

## 2021-09-23 DIAGNOSIS — M54.9 CHRONIC MIDLINE BACK PAIN, UNSPECIFIED BACK LOCATION: ICD-10-CM

## 2021-09-23 RX ORDER — TOPIRAMATE 25 MG/1
25 TABLET ORAL 2 TIMES DAILY
Qty: 60 TABLET | Refills: 3 | Status: SHIPPED | OUTPATIENT
Start: 2021-09-23 | End: 2021-09-26 | Stop reason: SDUPTHER

## 2021-09-23 RX ORDER — LEVOTHYROXINE SODIUM 0.12 MG/1
125 TABLET ORAL DAILY
Qty: 30 TABLET | Refills: 3 | Status: SHIPPED | OUTPATIENT
Start: 2021-09-23 | End: 2021-09-26 | Stop reason: SDUPTHER

## 2021-09-23 RX ORDER — BUTALBITAL, ACETAMINOPHEN AND CAFFEINE 50; 325; 40 MG/1; MG/1; MG/1
1 TABLET ORAL EVERY 6 HOURS PRN
Qty: 10 TABLET | Refills: 0 | Status: SHIPPED | OUTPATIENT
Start: 2021-09-23 | End: 2022-09-06 | Stop reason: SDUPTHER

## 2021-09-23 NOTE — TELEPHONE ENCOUNTER
[facility] at [address] [place] [address] [address]      Incoming Refill Request      Medication requested (name and dose): topiramate (Topamax) 25 MG tablet    Pharmacy where request [facility] [address]     Additional details provided by patient: traveling    Best call back number: [phone]     Does the patient have less than a 3 day supply:  [x] Yes  [] No    Racquel Nielsen  09/23/21, 15:10 CDT

## 2021-09-24 ENCOUNTER — TELEPHONE (OUTPATIENT)
Dept: FAMILY MEDICINE CLINIC | Facility: CLINIC | Age: 41
End: 2021-09-24

## 2021-09-24 NOTE — TELEPHONE ENCOUNTER
You sent in scripts for the patient. She wanted them sent to [facility] [address]. They were sent to [facility] here in town  Can you resend them to [place] [address] [facility]

## 2021-09-26 RX ORDER — ROPINIROLE 0.5 MG/1
0.5 TABLET, FILM COATED ORAL NIGHTLY
Qty: 30 TABLET | Refills: 3 | Status: SHIPPED | OUTPATIENT
Start: 2021-09-26 | End: 2022-09-06 | Stop reason: SDUPTHER

## 2021-09-26 RX ORDER — CYCLOBENZAPRINE HCL 5 MG
5 TABLET ORAL 3 TIMES DAILY PRN
Qty: 30 TABLET | Refills: 3 | Status: SHIPPED | OUTPATIENT
Start: 2021-09-26 | End: 2022-09-06 | Stop reason: SDUPTHER

## 2021-09-26 RX ORDER — LEVOTHYROXINE SODIUM 0.12 MG/1
125 TABLET ORAL DAILY
Qty: 30 TABLET | Refills: 3 | Status: SHIPPED | OUTPATIENT
Start: 2021-09-26 | End: 2022-09-06 | Stop reason: SDUPTHER

## 2021-09-26 RX ORDER — TOPIRAMATE 25 MG/1
25 TABLET ORAL 2 TIMES DAILY
Qty: 60 TABLET | Refills: 3 | Status: SHIPPED | OUTPATIENT
Start: 2021-09-26 | End: 2022-09-06

## 2021-09-26 RX ORDER — NICOTINE 21 MG/24HR
1 PATCH, TRANSDERMAL 24 HOURS TRANSDERMAL EVERY 24 HOURS
Qty: 30 PATCH | Refills: 3 | Status: SHIPPED | OUTPATIENT
Start: 2021-09-26 | End: 2022-09-06 | Stop reason: SDUPTHER

## 2021-09-26 RX ORDER — MELOXICAM 15 MG/1
15 TABLET ORAL DAILY
Qty: 30 TABLET | Refills: 3 | Status: SHIPPED | OUTPATIENT
Start: 2021-09-26 | End: 2022-09-06 | Stop reason: SDUPTHER

## 2022-03-10 DIAGNOSIS — G43.109 MIGRAINE EQUIVALENT: ICD-10-CM

## 2022-03-10 DIAGNOSIS — M54.9 CHRONIC MIDLINE BACK PAIN, UNSPECIFIED BACK LOCATION: ICD-10-CM

## 2022-03-10 DIAGNOSIS — G89.29 CHRONIC NECK PAIN: ICD-10-CM

## 2022-03-10 DIAGNOSIS — G89.29 CHRONIC MIDLINE BACK PAIN, UNSPECIFIED BACK LOCATION: ICD-10-CM

## 2022-03-10 DIAGNOSIS — M54.2 CHRONIC NECK PAIN: ICD-10-CM

## 2022-03-10 RX ORDER — ROPINIROLE 0.5 MG/1
0.5 TABLET, FILM COATED ORAL NIGHTLY
Qty: 30 TABLET | Refills: 3 | OUTPATIENT
Start: 2022-03-10

## 2022-03-10 RX ORDER — CYCLOBENZAPRINE HCL 5 MG
5 TABLET ORAL 3 TIMES DAILY PRN
Qty: 30 TABLET | Refills: 3 | OUTPATIENT
Start: 2022-03-10

## 2022-03-10 RX ORDER — BUTALBITAL, ACETAMINOPHEN AND CAFFEINE 50; 325; 40 MG/1; MG/1; MG/1
1 TABLET ORAL EVERY 6 HOURS PRN
Qty: 10 TABLET | Refills: 0 | OUTPATIENT
Start: 2022-03-10

## 2022-03-10 RX ORDER — MELOXICAM 15 MG/1
15 TABLET ORAL DAILY
Qty: 30 TABLET | Refills: 3 | OUTPATIENT
Start: 2022-03-10

## 2022-03-10 RX ORDER — TOPIRAMATE 25 MG/1
25 TABLET ORAL 2 TIMES DAILY
Qty: 60 TABLET | Refills: 3 | OUTPATIENT
Start: 2022-03-10

## 2022-03-10 RX ORDER — NICOTINE 21 MG/24HR
1 PATCH, TRANSDERMAL 24 HOURS TRANSDERMAL EVERY 24 HOURS
Qty: 30 PATCH | Refills: 3 | Status: CANCELLED | OUTPATIENT
Start: 2022-03-10

## 2022-03-10 RX ORDER — LEVOTHYROXINE SODIUM 0.12 MG/1
125 TABLET ORAL DAILY
Qty: 30 TABLET | Refills: 3 | OUTPATIENT
Start: 2022-03-10

## 2022-03-10 NOTE — TELEPHONE ENCOUNTER
Rx Refill Note  Requested Prescriptions     Refused Prescriptions Disp Refills   • levothyroxine (SYNTHROID, LEVOTHROID) 125 MCG tablet 30 tablet 3     Sig: Take 1 tablet by mouth Daily.   • cyclobenzaprine (FLEXERIL) 5 MG tablet 30 tablet 3     Sig: Take 1 tablet by mouth 3 (Three) Times a Day As Needed for Muscle Spasms.   • butalbital-acetaminophen-caffeine (Esgic) -40 MG per tablet 10 tablet 0     Sig: Take 1 tablet by mouth Every 6 (Six) Hours As Needed for Headache or Migraine.   • meloxicam (Mobic) 15 MG tablet 30 tablet 3     Sig: Take 1 tablet by mouth Daily.   • rOPINIRole (Requip) 0.5 MG tablet 30 tablet 3     Sig: Take 1 tablet by mouth Every Night. Take 1 hour before bedtime.   • topiramate (Topamax) 25 MG tablet 60 tablet 3     Sig: Take 1 tablet by mouth 2 (Two) Times a Day.      Last office visit with prescribing clinician: 3/31/2021      Next office visit with prescribing clinician: Visit date not found   {TIP  Encounters:    PT HAS NOT BEEN SEEN OR HAD LABS SINCE 3/2021. APPT NEEDED.    {TIP  Please add Last Relevant Lab Date if appropriate  {TIP  Is Refill Pharmacy correct?  Lon Rojas LPN  03/10/22, 16:08 CST

## 2022-03-10 NOTE — TELEPHONE ENCOUNTER
Patient called for medication refills. Patient has not been seen in office since March of 2021, so asked to set up follow up appointment. Patient stated she is currently out of [place] and doesn't know when she will be back as she is staying with her sick mother.

## 2022-03-16 RX ORDER — LEVOTHYROXINE SODIUM 125 UG/1
TABLET ORAL
Qty: 30 TABLET | Refills: 0 | OUTPATIENT
Start: 2022-03-16

## 2022-03-16 RX ORDER — LEVOTHYROXINE SODIUM 0.12 MG/1
125 TABLET ORAL DAILY
Qty: 30 TABLET | Refills: 3 | OUTPATIENT
Start: 2022-03-16

## 2022-03-16 NOTE — TELEPHONE ENCOUNTER
Rx Refill Note  Requested Prescriptions     Pending Prescriptions Disp Refills   • Euthyrox 125 MCG tablet [Pharmacy Med Name: Euthyrox 125 MCG Oral Tablet] 30 tablet 0     Sig: Take 1 tablet by mouth once daily      Last office visit with prescribing clinician: 3/31/2021      Next office visit with prescribing clinician: Visit date not found   {TIP  Encounters:    Pt has not been seen since 3/31/21    {TIP  Please add Last Relevant Lab Date if appropriate:    Lon Rojas LPN  03/16/22, 14:32 CDT

## 2022-03-16 NOTE — TELEPHONE ENCOUNTER
Rx Refill Note  Requested Prescriptions     Refused Prescriptions Disp Refills   • levothyroxine (SYNTHROID, LEVOTHROID) 125 MCG tablet [Pharmacy Med Name: LEVOTHYROXINE 0.125MG (125MCG) TAB] 30 tablet 3     Sig: Take 1 tablet by mouth Daily.      Last office visit with prescribing clinician: 3/31/2021      Next office visit with prescribing clinician: Visit date not found   {TIP  Encounters:    PT HAS NOT BEEN SEEN OR HAD LABS SINCE 3/2021.         Lon Rojas LPN  03/16/22, 13:25 CDT

## 2022-04-09 ENCOUNTER — HOSPITAL ENCOUNTER (EMERGENCY)
Facility: HOSPITAL | Age: 42
Discharge: HOME | End: 2022-04-09
Attending: EMERGENCY MEDICINE

## 2022-04-09 ENCOUNTER — APPOINTMENT (EMERGENCY)
Dept: RADIOLOGY | Facility: HOSPITAL | Age: 42
End: 2022-04-09

## 2022-04-09 VITALS
RESPIRATION RATE: 16 BRPM | OXYGEN SATURATION: 98 % | SYSTOLIC BLOOD PRESSURE: 132 MMHG | TEMPERATURE: 97.1 F | WEIGHT: 145.2 LBS | DIASTOLIC BLOOD PRESSURE: 69 MMHG | HEART RATE: 82 BPM
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
## 2022-04-09 DIAGNOSIS — S22.31XA RIGHT RIB FRACTURE: ICD-10-CM

## 2022-04-09 PROCEDURE — 71101 X-RAY EXAM UNILAT RIBS/CHEST: CPT

## 2022-04-09 PROCEDURE — 99284 EMERGENCY DEPT VISIT MOD MDM: CPT

## 2022-04-09 PROCEDURE — 99284 EMERGENCY DEPT VISIT MOD MDM: CPT | Performed by: EMERGENCY MEDICINE

## 2022-04-09 RX ORDER — TRAMADOL HYDROCHLORIDE 50 MG/1
50 TABLET ORAL EVERY 6 HOURS PRN
Qty: 10 TABLET | Refills: 0 | Status: SHIPPED | OUTPATIENT
Start: 2022-04-09 | End: 2022-04-14

## 2022-04-09 RX ORDER — LIDOCAINE 50 MG/G
1 PATCH TOPICAL ONCE
Status: DISCONTINUED | OUTPATIENT
Start: 2022-04-09 | End: 2022-04-09 | Stop reason: HOSPADM

## 2022-04-09 RX ORDER — OXYCODONE HYDROCHLORIDE AND ACETAMINOPHEN 5; 325 MG/1; MG/1
1 TABLET ORAL ONCE
Status: COMPLETED | OUTPATIENT
Start: 2022-04-09 | End: 2022-04-09

## 2022-04-09 RX ORDER — LIDOCAINE 50 MG/G
1 PATCH TOPICAL DAILY
Qty: 5 PATCH | Refills: 0 | Status: SHIPPED | OUTPATIENT
Start: 2022-04-09 | End: 2022-07-11

## 2022-04-09 RX ADMIN — OXYCODONE HYDROCHLORIDE AND ACETAMINOPHEN 1 TABLET: 5; 325 TABLET ORAL at 16:56

## 2022-04-09 RX ADMIN — LIDOCAINE 1 PATCH: 50 PATCH TOPICAL at 16:54

## 2022-04-09 NOTE — Clinical Note
Kate Diop was seen and treated in our emergency department on 4/9/2022  Diagnosis:     Ming Barnard  may return to work on return date  She may return on this date: 04/12/2022         If you have any questions or concerns, please don't hesitate to call        Cole Zee MD    ______________________________           _______________          _______________  Hospital Representative                              Date                                Time
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
## 2022-04-09 NOTE — DISCHARGE INSTRUCTIONS
Take medication as prescribed, do not drive while taking tramadol  Apply lidocaine patch every 24 hours

## 2022-07-09 ENCOUNTER — HOSPITAL ENCOUNTER (INPATIENT)
Facility: HOSPITAL | Age: 42
LOS: 1 days | Discharge: HOME | DRG: 773 | End: 2022-07-11
Attending: EMERGENCY MEDICINE | Admitting: EMERGENCY MEDICINE
Payer: COMMERCIAL

## 2022-07-09 DIAGNOSIS — F11.20 OPIOID USE DISORDER, SEVERE, DEPENDENCE (HCC): Primary | ICD-10-CM

## 2022-07-09 DIAGNOSIS — F11.10 HEROIN ABUSE (HCC): ICD-10-CM

## 2022-07-09 DIAGNOSIS — R82.71 BACTERIURIA: ICD-10-CM

## 2022-07-09 DIAGNOSIS — F19.939 DRUG WITHDRAWAL (HCC): ICD-10-CM

## 2022-07-09 DIAGNOSIS — F11.20 OPIOID USE DISORDER, MODERATE, DEPENDENCE (HCC): ICD-10-CM

## 2022-07-09 DIAGNOSIS — E03.9 HYPOTHYROIDISM, UNSPECIFIED TYPE: ICD-10-CM

## 2022-07-09 PROBLEM — F11.23 OPIOID WITHDRAWAL (HCC): Status: ACTIVE | Noted: 2022-07-09

## 2022-07-09 PROBLEM — F11.93 OPIOID WITHDRAWAL (HCC): Status: ACTIVE | Noted: 2022-07-09

## 2022-07-09 PROBLEM — F15.10 METHAMPHETAMINE USE (HCC): Status: ACTIVE | Noted: 2022-07-09

## 2022-07-09 PROBLEM — R74.01 TRANSAMINITIS: Status: ACTIVE | Noted: 2022-07-09

## 2022-07-09 PROBLEM — F17.200 TOBACCO USE DISORDER: Status: ACTIVE | Noted: 2022-07-09

## 2022-07-09 LAB
ALBUMIN SERPL BCP-MCNC: 4.9 G/DL (ref 3–5.2)
ALP SERPL-CCNC: 71 U/L (ref 43–122)
ALT SERPL W P-5'-P-CCNC: 70 U/L
AMPHETAMINES SERPL QL SCN: POSITIVE
ANION GAP SERPL CALCULATED.3IONS-SCNC: 10 MMOL/L (ref 5–14)
AST SERPL W P-5'-P-CCNC: 58 U/L (ref 14–36)
BACTERIA UR QL AUTO: ABNORMAL /HPF
BARBITURATES UR QL: NEGATIVE
BASOPHILS # BLD AUTO: 0.06 THOUSANDS/ΜL (ref 0–0.1)
BASOPHILS NFR BLD AUTO: 1 % (ref 0–1)
BENZODIAZ UR QL: NEGATIVE
BILIRUB SERPL-MCNC: 0.81 MG/DL
BILIRUB UR QL STRIP: NEGATIVE
BUN SERPL-MCNC: 12 MG/DL (ref 5–25)
CALCIUM SERPL-MCNC: 10 MG/DL (ref 8.4–10.2)
CHLORIDE SERPL-SCNC: 101 MMOL/L (ref 97–108)
CLARITY UR: ABNORMAL
CO2 SERPL-SCNC: 29 MMOL/L (ref 22–30)
COCAINE UR QL: POSITIVE
COLOR UR: ABNORMAL
CREAT SERPL-MCNC: 0.7 MG/DL (ref 0.6–1.2)
EOSINOPHIL # BLD AUTO: 0.01 THOUSAND/ΜL (ref 0–0.61)
EOSINOPHIL NFR BLD AUTO: 0 % (ref 0–6)
ERYTHROCYTE [DISTWIDTH] IN BLOOD BY AUTOMATED COUNT: 14.4 % (ref 11.6–15.1)
ETHANOL SERPL-MCNC: <10 MG/DL (ref 0–10)
EXT PREG TEST URINE: NEGATIVE
EXT. CONTROL ED NAV: NORMAL
GFR SERPL CREATININE-BSD FRML MDRD: 107 ML/MIN/1.73SQ M
GLUCOSE SERPL-MCNC: 101 MG/DL (ref 70–99)
GLUCOSE UR STRIP-MCNC: NEGATIVE MG/DL
HCT VFR BLD AUTO: 41.7 % (ref 34.8–46.1)
HGB BLD-MCNC: 13 G/DL (ref 11.5–15.4)
HGB UR QL STRIP.AUTO: 25
HIV 1+2 AB+HIV1 P24 AG SERPL QL IA: NORMAL
HIV1 P24 AG SER QL: NORMAL
IMM GRANULOCYTES # BLD AUTO: 0.03 THOUSAND/UL (ref 0–0.2)
IMM GRANULOCYTES NFR BLD AUTO: 0 % (ref 0–2)
KETONES UR STRIP-MCNC: NEGATIVE MG/DL
LEUKOCYTE ESTERASE UR QL STRIP: 500
LYMPHOCYTES # BLD AUTO: 3.22 THOUSANDS/ΜL (ref 0.6–4.47)
LYMPHOCYTES NFR BLD AUTO: 40 % (ref 14–44)
MAGNESIUM SERPL-MCNC: 2.1 MG/DL (ref 1.6–2.3)
MCH RBC QN AUTO: 26.9 PG (ref 26.8–34.3)
MCHC RBC AUTO-ENTMCNC: 31.2 G/DL (ref 31.4–37.4)
MCV RBC AUTO: 86 FL (ref 82–98)
METHADONE UR QL: NEGATIVE
MONOCYTES # BLD AUTO: 0.83 THOUSAND/ΜL (ref 0.17–1.22)
MONOCYTES NFR BLD AUTO: 10 % (ref 4–12)
MUCOUS THREADS UR QL AUTO: ABNORMAL
NEUTROPHILS # BLD AUTO: 3.89 THOUSANDS/ΜL (ref 1.85–7.62)
NEUTS SEG NFR BLD AUTO: 49 % (ref 43–75)
NITRITE UR QL STRIP: NEGATIVE
NON-SQ EPI CELLS URNS QL MICRO: ABNORMAL /HPF
NRBC BLD AUTO-RTO: 0 /100 WBCS
OPIATES UR QL SCN: NEGATIVE
OXYCODONE+OXYMORPHONE UR QL SCN: NEGATIVE
PCP UR QL: NEGATIVE
PH UR STRIP.AUTO: 6 [PH]
PLATELET # BLD AUTO: 438 THOUSANDS/UL (ref 149–390)
PMV BLD AUTO: 9.3 FL (ref 8.9–12.7)
POTASSIUM SERPL-SCNC: 4 MMOL/L (ref 3.6–5)
PROT SERPL-MCNC: 8.9 G/DL (ref 5.9–8.4)
PROT UR STRIP-MCNC: ABNORMAL MG/DL
RBC # BLD AUTO: 4.83 MILLION/UL (ref 3.81–5.12)
RBC #/AREA URNS AUTO: ABNORMAL /HPF
SARS-COV-2 RNA RESP QL NAA+PROBE: NEGATIVE
SODIUM SERPL-SCNC: 140 MMOL/L (ref 137–147)
SP GR UR STRIP.AUTO: 1.02 (ref 1–1.04)
THC UR QL: NEGATIVE
TSH SERPL DL<=0.05 MIU/L-ACNC: 14.7 UIU/ML (ref 0.45–4.5)
UROBILINOGEN UA: NEGATIVE MG/DL
WBC # BLD AUTO: 8.04 THOUSAND/UL (ref 4.31–10.16)
WBC #/AREA URNS AUTO: ABNORMAL /HPF

## 2022-07-09 PROCEDURE — 99285 EMERGENCY DEPT VISIT HI MDM: CPT | Performed by: EMERGENCY MEDICINE

## 2022-07-09 PROCEDURE — 87077 CULTURE AEROBIC IDENTIFY: CPT | Performed by: NURSE PRACTITIONER

## 2022-07-09 PROCEDURE — 96360 HYDRATION IV INFUSION INIT: CPT

## 2022-07-09 PROCEDURE — 81025 URINE PREGNANCY TEST: CPT | Performed by: EMERGENCY MEDICINE

## 2022-07-09 PROCEDURE — 87086 URINE CULTURE/COLONY COUNT: CPT | Performed by: NURSE PRACTITIONER

## 2022-07-09 PROCEDURE — 87806 HIV AG W/HIV1&2 ANTB W/OPTIC: CPT | Performed by: NURSE PRACTITIONER

## 2022-07-09 PROCEDURE — 87186 SC STD MICRODIL/AGAR DIL: CPT | Performed by: NURSE PRACTITIONER

## 2022-07-09 PROCEDURE — 36415 COLL VENOUS BLD VENIPUNCTURE: CPT | Performed by: EMERGENCY MEDICINE

## 2022-07-09 PROCEDURE — 93005 ELECTROCARDIOGRAM TRACING: CPT

## 2022-07-09 PROCEDURE — 82077 ASSAY SPEC XCP UR&BREATH IA: CPT | Performed by: EMERGENCY MEDICINE

## 2022-07-09 PROCEDURE — 84443 ASSAY THYROID STIM HORMONE: CPT | Performed by: NURSE PRACTITIONER

## 2022-07-09 PROCEDURE — 81001 URINALYSIS AUTO W/SCOPE: CPT | Performed by: EMERGENCY MEDICINE

## 2022-07-09 PROCEDURE — 85025 COMPLETE CBC W/AUTO DIFF WBC: CPT | Performed by: EMERGENCY MEDICINE

## 2022-07-09 PROCEDURE — 99285 EMERGENCY DEPT VISIT HI MDM: CPT

## 2022-07-09 PROCEDURE — 83735 ASSAY OF MAGNESIUM: CPT | Performed by: EMERGENCY MEDICINE

## 2022-07-09 PROCEDURE — 80074 ACUTE HEPATITIS PANEL: CPT | Performed by: NURSE PRACTITIONER

## 2022-07-09 PROCEDURE — 80307 DRUG TEST PRSMV CHEM ANLYZR: CPT | Performed by: EMERGENCY MEDICINE

## 2022-07-09 PROCEDURE — 80053 COMPREHEN METABOLIC PANEL: CPT | Performed by: EMERGENCY MEDICINE

## 2022-07-09 PROCEDURE — U0005 INFEC AGEN DETEC AMPLI PROBE: HCPCS | Performed by: EMERGENCY MEDICINE

## 2022-07-09 PROCEDURE — 99223 1ST HOSP IP/OBS HIGH 75: CPT | Performed by: NURSE PRACTITIONER

## 2022-07-09 PROCEDURE — U0003 INFECTIOUS AGENT DETECTION BY NUCLEIC ACID (DNA OR RNA); SEVERE ACUTE RESPIRATORY SYNDROME CORONAVIRUS 2 (SARS-COV-2) (CORONAVIRUS DISEASE [COVID-19]), AMPLIFIED PROBE TECHNIQUE, MAKING USE OF HIGH THROUGHPUT TECHNOLOGIES AS DESCRIBED BY CMS-2020-01-R: HCPCS | Performed by: EMERGENCY MEDICINE

## 2022-07-09 RX ORDER — NICOTINE 21 MG/24HR
21 PATCH, TRANSDERMAL 24 HOURS TRANSDERMAL DAILY
Status: DISCONTINUED | OUTPATIENT
Start: 2022-07-09 | End: 2022-07-11 | Stop reason: HOSPADM

## 2022-07-09 RX ORDER — ENOXAPARIN SODIUM 100 MG/ML
40 INJECTION SUBCUTANEOUS DAILY
Status: DISCONTINUED | OUTPATIENT
Start: 2022-07-10 | End: 2022-07-11 | Stop reason: HOSPADM

## 2022-07-09 RX ORDER — FLUTICASONE PROPIONATE 50 MCG
1 SPRAY, SUSPENSION (ML) NASAL 3 TIMES DAILY PRN
COMMUNITY
End: 2022-07-11

## 2022-07-09 RX ORDER — ONDANSETRON 4 MG/1
4 TABLET, ORALLY DISINTEGRATING ORAL EVERY 6 HOURS PRN
Status: DISCONTINUED | OUTPATIENT
Start: 2022-07-09 | End: 2022-07-11 | Stop reason: HOSPADM

## 2022-07-09 RX ORDER — SODIUM CHLORIDE 9 MG/ML
100 INJECTION, SOLUTION INTRAVENOUS CONTINUOUS
Status: DISCONTINUED | OUTPATIENT
Start: 2022-07-09 | End: 2022-07-11 | Stop reason: HOSPADM

## 2022-07-09 RX ORDER — ACETAMINOPHEN 325 MG/1
650 TABLET ORAL EVERY 6 HOURS PRN
Status: DISCONTINUED | OUTPATIENT
Start: 2022-07-09 | End: 2022-07-11 | Stop reason: HOSPADM

## 2022-07-09 RX ORDER — DIPHENOXYLATE HYDROCHLORIDE AND ATROPINE SULFATE 2.5; .025 MG/1; MG/1
1 TABLET ORAL DAILY
COMMUNITY
End: 2022-07-11

## 2022-07-09 RX ORDER — LEVOTHYROXINE SODIUM 0.15 MG/1
150 TABLET ORAL DAILY
COMMUNITY

## 2022-07-09 RX ORDER — CLONIDINE HYDROCHLORIDE 0.1 MG/1
0.1 TABLET ORAL EVERY 6 HOURS PRN
Status: DISCONTINUED | OUTPATIENT
Start: 2022-07-09 | End: 2022-07-11

## 2022-07-09 RX ORDER — BUPRENORPHINE 20 UG/H
20 PATCH TRANSDERMAL
Status: DISCONTINUED | OUTPATIENT
Start: 2022-07-09 | End: 2022-07-11 | Stop reason: HOSPADM

## 2022-07-09 RX ADMIN — CLONIDINE HYDROCHLORIDE 0.1 MG: 0.1 TABLET ORAL at 18:19

## 2022-07-09 RX ADMIN — ONDANSETRON 4 MG: 4 TABLET, ORALLY DISINTEGRATING ORAL at 20:20

## 2022-07-09 RX ADMIN — SODIUM CHLORIDE 100 ML/HR: 0.9 INJECTION, SOLUTION INTRAVENOUS at 21:41

## 2022-07-09 RX ADMIN — SODIUM CHLORIDE 1000 ML: 0.9 INJECTION, SOLUTION INTRAVENOUS at 15:23

## 2022-07-09 RX ADMIN — BUPRENORPHINE 20 MCG: 20 PATCH, EXTENDED RELEASE TRANSDERMAL at 18:20

## 2022-07-09 RX ADMIN — NICOTINE 21 MG: 21 PATCH, EXTENDED RELEASE TRANSDERMAL at 21:24

## 2022-07-09 RX ADMIN — BUPRENORPHINE 20 MCG: 20 PATCH, EXTENDED RELEASE TRANSDERMAL at 18:19

## 2022-07-09 RX ADMIN — ACETAMINOPHEN 650 MG: 325 TABLET ORAL at 20:20

## 2022-07-09 NOTE — ED PROVIDER NOTES
History  Chief Complaint   Patient presents with    Withdrawal - Drug     States she uses 20 bags of heroin per day with last use being Thursday  States that her skin feel numb throughout her body     50-year-old female presents requesting detox  She states that she has been using 20 bags of heroin daily for several months  She admits to using ice at some point but denies any other drug or alcohol use recently  She states her last use was 1-2 days ago when over the past several hours she has been experiencing GI symptoms, diffuse myalgias arthralgias, and episodic patches of tingling  She denies any thoughts of self-harm or suicide  She denies any prior admissions to a detox or rehab unit  Drug Problem  Severity:  Severe  Onset quality:  Gradual  Timing:  Constant  Progression:  Unchanged  Chronicity:  Chronic  Relieved by:  Not using drugs  Worsened by:  Using drugs  Ineffective treatments:  None tried  Associated symptoms: abdominal pain (cramping), diarrhea, fatigue and myalgias    Associated symptoms: no chest pain, no congestion, no cough, no ear pain, no fever, no headaches, no loss of consciousness, no nausea, no rash, no rhinorrhea, no shortness of breath, no sore throat, no vomiting and no wheezing        Prior to Admission Medications   Prescriptions Last Dose Informant Patient Reported? Taking?   lidocaine (Lidoderm) 5 %   No No   Sig: Apply 1 patch topically daily for 5 days Remove & Discard patch within 12 hours or as directed by MD      Facility-Administered Medications: None       Past Medical History:   Diagnosis Date    Disease of thyroid gland     Scoliosis        History reviewed  No pertinent surgical history  History reviewed  No pertinent family history  I have reviewed and agree with the history as documented      E-Cigarette/Vaping    E-Cigarette Use Never User      E-Cigarette/Vaping Substances    Nicotine No     THC No     CBD No     Flavoring No      Social History Tobacco Use    Smoking status: Heavy Tobacco Smoker     Packs/day: 1 00     Types: Cigarettes    Smokeless tobacco: Never Used   Vaping Use    Vaping Use: Never used   Substance Use Topics    Alcohol use: Not Currently    Drug use: Yes     Types: Heroin       Review of Systems   Constitutional: Positive for fatigue  Negative for appetite change, chills and fever  HENT: Negative for congestion, ear pain, postnasal drip, rhinorrhea, sinus pain, sore throat and trouble swallowing  Eyes: Negative for redness and itching  Respiratory: Negative for cough, chest tightness, shortness of breath and wheezing  Cardiovascular: Negative for chest pain and leg swelling  Gastrointestinal: Positive for abdominal pain (cramping) and diarrhea  Negative for constipation, nausea and vomiting  Endocrine: Negative  Genitourinary: Negative for difficulty urinating and dysuria  Musculoskeletal: Positive for arthralgias and myalgias  Negative for back pain  Skin: Negative for rash  Allergic/Immunologic: Negative  Neurological: Positive for numbness (resoled)  Negative for dizziness, loss of consciousness, weakness, light-headedness and headaches  Hematological: Negative  Psychiatric/Behavioral: Negative  All other systems reviewed and are negative  Physical Exam  Physical Exam  Vitals and nursing note reviewed  Constitutional:       General: She is not in acute distress  Appearance: Normal appearance  She is well-developed  She is not ill-appearing or toxic-appearing  HENT:      Head: Normocephalic and atraumatic  Right Ear: External ear normal       Left Ear: External ear normal       Nose: Nose normal  No congestion  Mouth/Throat:      Mouth: Mucous membranes are moist       Pharynx: Oropharynx is clear  Eyes:      Conjunctiva/sclera: Conjunctivae normal       Pupils: Pupils are equal, round, and reactive to light     Cardiovascular:      Rate and Rhythm: Normal rate and regular rhythm  Heart sounds: Normal heart sounds  Pulmonary:      Effort: Pulmonary effort is normal  No respiratory distress  Breath sounds: Normal breath sounds  No wheezing  Abdominal:      General: Bowel sounds are normal       Palpations: Abdomen is soft  Tenderness: There is no abdominal tenderness  There is no guarding  Musculoskeletal:         General: No tenderness or deformity  Cervical back: Normal range of motion and neck supple  No rigidity  Skin:     General: Skin is warm and dry  Capillary Refill: Capillary refill takes less than 2 seconds  Findings: No rash  Neurological:      General: No focal deficit present  Mental Status: She is alert and oriented to person, place, and time  Psychiatric:         Attention and Perception: Attention and perception normal          Mood and Affect: Mood normal          Speech: Speech normal          Behavior: Behavior normal  Behavior is cooperative  Thought Content:  Thought content normal          Cognition and Memory: Cognition and memory normal          Judgment: Judgment normal          Vital Signs  ED Triage Vitals [07/09/22 1411]   Temperature Pulse Respirations Blood Pressure SpO2   98 3 °F (36 8 °C) 79 18 126/83 99 %      Temp Source Heart Rate Source Patient Position - Orthostatic VS BP Location FiO2 (%)   Oral Monitor Sitting Left arm --      Pain Score       --           Vitals:    07/09/22 1411 07/09/22 1524   BP: 126/83 131/83   Pulse: 79 74   Patient Position - Orthostatic VS: Sitting Sitting         Visual Acuity      ED Medications  Medications   sodium chloride 0 9 % bolus 1,000 mL (1,000 mL Intravenous New Bag 7/9/22 1523)       Diagnostic Studies  Results Reviewed     Procedure Component Value Units Date/Time    CBC and differential [[id number]]  (Abnormal) Collected: 07/09/22 1523    Lab Status: Final result Specimen: Blood from Arm, Left Updated: 07/09/22 1535     WBC 8 04 Thousand/uL RBC 4 83 Million/uL      Hemoglobin 13 0 g/dL      Hematocrit 41 7 %      MCV 86 fL      MCH 26 9 pg      MCHC 31 2 g/dL      RDW 14 4 %      MPV 9 3 fL      Platelets 386 Thousands/uL      nRBC 0 /100 WBCs      Neutrophils Relative 49 %      Immat GRANS % 0 %      Lymphocytes Relative 40 %      Monocytes Relative 10 %      Eosinophils Relative 0 %      Basophils Relative 1 %      Neutrophils Absolute 3 89 Thousands/µL      Immature Grans Absolute 0 03 Thousand/uL      Lymphocytes Absolute 3 22 Thousands/µL      Monocytes Absolute 0 83 Thousand/µL      Eosinophils Absolute 0 01 Thousand/µL      Basophils Absolute 0 06 Thousands/µL     Comprehensive metabolic panel [[id number]] Collected: 07/09/22 1523    Lab Status: In process Specimen: Blood from Arm, Left Updated: 07/09/22 1533    Magnesium [[id number]] Collected: 07/09/22 1523    Lab Status: In process Specimen: Blood from Arm, Left Updated: 07/09/22 1533    Ethanol [[id number]] Collected: 07/09/22 1523    Lab Status: In process Specimen: Blood from Arm, Left Updated: 07/09/22 [address] [[id number]] Collected: 07/09/22 1523    Lab Status: In process Specimen: Nares from Nose Updated: 07/09/22 1528    Rapid drug screen, urine [[id number]]     Lab Status: No result Specimen: Urine     UA (URINE) with reflex to Scope [[id number]]     Lab Status: No result Specimen: Urine     POCT pregnancy, urine [[id number]]     Lab Status: No result                  No orders to display              Procedures  Procedures         ED Course                                             MDM    Disposition  Final diagnoses:   None     ED Disposition     None      Follow-up Information    None         Patient's Medications   Discharge Prescriptions    No medications on file       No discharge procedures on file      PDMP Review     None          ED Provider  Electronically Signed by           Sirena Bella DO  07/10/22 8187
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
## 2022-07-09 NOTE — ED NOTES
Report given to Detox   Pt left department in stable condition      Re Wallace RN  07/09/22 8399
[id number]

## 2022-07-09 NOTE — ED PROCEDURE NOTE
PROCEDURE  ECG 12 Lead Documentation Only    Date/Time: 7/9/2022 4:35 PM  Performed by: Osmani Murillo DO  Authorized by: Osmani Murillo DO     ECG reviewed by me, the ED Provider: yes    Patient location:  ED  Previous ECG:     Previous ECG:  Compared to current    Similarity:  No change  Interpretation:     Interpretation: normal    Rate:     ECG rate assessment: normal    Rhythm:     Rhythm: sinus rhythm    Ectopy:     Ectopy: none    QRS:     QRS axis:  Normal    QRS intervals:  Normal  ST segments:     ST segments:  Normal  T waves:     T waves: normal    Comments:      Short [place] interalNEIL DO  07/09/22 0794

## 2022-07-10 LAB
ALBUMIN SERPL BCP-MCNC: 3.7 G/DL (ref 3–5.2)
ALP SERPL-CCNC: 62 U/L (ref 43–122)
ALT SERPL W P-5'-P-CCNC: 49 U/L
ANION GAP SERPL CALCULATED.3IONS-SCNC: 9 MMOL/L (ref 5–14)
AST SERPL W P-5'-P-CCNC: 40 U/L (ref 14–36)
ATRIAL RATE: 68 BPM
BILIRUB SERPL-MCNC: 0.62 MG/DL
BUN SERPL-MCNC: 10 MG/DL (ref 5–25)
CALCIUM SERPL-MCNC: 8.7 MG/DL (ref 8.4–10.2)
CHLORIDE SERPL-SCNC: 107 MMOL/L (ref 97–108)
CO2 SERPL-SCNC: 22 MMOL/L (ref 22–30)
CREAT SERPL-MCNC: 0.65 MG/DL (ref 0.6–1.2)
ERYTHROCYTE [DISTWIDTH] IN BLOOD BY AUTOMATED COUNT: 14.3 % (ref 11.6–15.1)
GFR SERPL CREATININE-BSD FRML MDRD: 110 ML/MIN/1.73SQ M
GLUCOSE SERPL-MCNC: 100 MG/DL (ref 70–99)
HCT VFR BLD AUTO: 37.1 % (ref 34.8–46.1)
HGB BLD-MCNC: 11.9 G/DL (ref 11.5–15.4)
MCH RBC QN AUTO: 27 PG (ref 26.8–34.3)
MCHC RBC AUTO-ENTMCNC: 32.1 G/DL (ref 31.4–37.4)
MCV RBC AUTO: 84 FL (ref 82–98)
P AXIS: 48 DEGREES
PLATELET # BLD AUTO: 370 THOUSANDS/UL (ref 149–390)
PMV BLD AUTO: 9.6 FL (ref 8.9–12.7)
POTASSIUM SERPL-SCNC: 3.7 MMOL/L (ref 3.6–5)
PR INTERVAL: 86 MS
PROT SERPL-MCNC: 6.7 G/DL (ref 5.9–8.4)
QRS AXIS: 49 DEGREES
QRSD INTERVAL: 78 MS
QT INTERVAL: 398 MS
QTC INTERVAL: 423 MS
RBC # BLD AUTO: 4.4 MILLION/UL (ref 3.81–5.12)
SODIUM SERPL-SCNC: 138 MMOL/L (ref 137–147)
T WAVE AXIS: 40 DEGREES
T4 FREE SERPL-MCNC: 0.81 NG/DL (ref 0.76–1.46)
TSH SERPL DL<=0.05 MIU/L-ACNC: 10.5 UIU/ML (ref 0.45–4.5)
VENTRICULAR RATE: 68 BPM
WBC # BLD AUTO: 6.35 THOUSAND/UL (ref 4.31–10.16)

## 2022-07-10 PROCEDURE — 85027 COMPLETE CBC AUTOMATED: CPT | Performed by: NURSE PRACTITIONER

## 2022-07-10 PROCEDURE — 93010 ELECTROCARDIOGRAM REPORT: CPT | Performed by: INTERNAL MEDICINE

## 2022-07-10 PROCEDURE — HZ2ZZZZ DETOXIFICATION SERVICES FOR SUBSTANCE ABUSE TREATMENT: ICD-10-PCS | Performed by: EMERGENCY MEDICINE

## 2022-07-10 PROCEDURE — 99232 SBSQ HOSP IP/OBS MODERATE 35: CPT | Performed by: NURSE PRACTITIONER

## 2022-07-10 PROCEDURE — 84439 ASSAY OF FREE THYROXINE: CPT | Performed by: PHYSICIAN ASSISTANT

## 2022-07-10 PROCEDURE — 84443 ASSAY THYROID STIM HORMONE: CPT | Performed by: PHYSICIAN ASSISTANT

## 2022-07-10 PROCEDURE — 80053 COMPREHEN METABOLIC PANEL: CPT | Performed by: NURSE PRACTITIONER

## 2022-07-10 RX ORDER — BUPRENORPHINE AND NALOXONE 8; 2 MG/1; MG/1
8 FILM, SOLUBLE BUCCAL; SUBLINGUAL 2 TIMES DAILY
Status: DISCONTINUED | OUTPATIENT
Start: 2022-07-10 | End: 2022-07-11 | Stop reason: HOSPADM

## 2022-07-10 RX ORDER — LANOLIN ALCOHOL/MO/W.PET/CERES
6 CREAM (GRAM) TOPICAL
Status: DISCONTINUED | OUTPATIENT
Start: 2022-07-10 | End: 2022-07-11 | Stop reason: HOSPADM

## 2022-07-10 RX ORDER — BUPRENORPHINE AND NALOXONE 2; .5 MG/1; MG/1
2 FILM, SOLUBLE BUCCAL; SUBLINGUAL
Status: COMPLETED | OUTPATIENT
Start: 2022-07-10 | End: 2022-07-10

## 2022-07-10 RX ORDER — CLONAZEPAM 1 MG/1
1 TABLET ORAL EVERY 8 HOURS PRN
Status: DISCONTINUED | OUTPATIENT
Start: 2022-07-10 | End: 2022-07-11 | Stop reason: HOSPADM

## 2022-07-10 RX ORDER — FLUTICASONE PROPIONATE 50 MCG
1 SPRAY, SUSPENSION (ML) NASAL DAILY
Status: DISCONTINUED | OUTPATIENT
Start: 2022-07-10 | End: 2022-07-11 | Stop reason: HOSPADM

## 2022-07-10 RX ORDER — GUAIFENESIN 600 MG/1
600 TABLET, EXTENDED RELEASE ORAL EVERY 12 HOURS SCHEDULED
Status: DISCONTINUED | OUTPATIENT
Start: 2022-07-10 | End: 2022-07-11 | Stop reason: HOSPADM

## 2022-07-10 RX ORDER — BUPRENORPHINE AND NALOXONE 2; .5 MG/1; MG/1
4 FILM, SOLUBLE BUCCAL; SUBLINGUAL DAILY
Status: DISCONTINUED | OUTPATIENT
Start: 2022-07-10 | End: 2022-07-11

## 2022-07-10 RX ORDER — LEVOTHYROXINE SODIUM 0.07 MG/1
150 TABLET ORAL
Status: DISCONTINUED | OUTPATIENT
Start: 2022-07-10 | End: 2022-07-10

## 2022-07-10 RX ORDER — GABAPENTIN 300 MG/1
300 CAPSULE ORAL 3 TIMES DAILY PRN
Status: DISCONTINUED | OUTPATIENT
Start: 2022-07-10 | End: 2022-07-11 | Stop reason: HOSPADM

## 2022-07-10 RX ORDER — LOPERAMIDE HYDROCHLORIDE 2 MG/1
4 CAPSULE ORAL ONCE
Status: COMPLETED | OUTPATIENT
Start: 2022-07-10 | End: 2022-07-10

## 2022-07-10 RX ADMIN — GUAIFENESIN 600 MG: 600 TABLET, EXTENDED RELEASE ORAL at 02:23

## 2022-07-10 RX ADMIN — CLONIDINE HYDROCHLORIDE 0.1 MG: 0.1 TABLET ORAL at 00:04

## 2022-07-10 RX ADMIN — BUPRENORPHINE HYDROCHLORIDE, NALOXONE HYDROCHLORIDE 2 MG: 2; .5 FILM, SOLUBLE BUCCAL; SUBLINGUAL at 10:06

## 2022-07-10 RX ADMIN — ACETAMINOPHEN 650 MG: 325 TABLET ORAL at 05:01

## 2022-07-10 RX ADMIN — MELATONIN TAB 3 MG 6 MG: 3 TAB at 00:37

## 2022-07-10 RX ADMIN — CLONIDINE HYDROCHLORIDE 0.1 MG: 0.1 TABLET ORAL at 08:15

## 2022-07-10 RX ADMIN — NICOTINE 21 MG: 21 PATCH, EXTENDED RELEASE TRANSDERMAL at 08:02

## 2022-07-10 RX ADMIN — GUAIFENESIN 600 MG: 600 TABLET, EXTENDED RELEASE ORAL at 21:02

## 2022-07-10 RX ADMIN — LEVOTHYROXINE SODIUM 175 MCG: 100 TABLET ORAL at 08:02

## 2022-07-10 RX ADMIN — SODIUM CHLORIDE 100 ML/HR: 0.9 INJECTION, SOLUTION INTRAVENOUS at 18:58

## 2022-07-10 RX ADMIN — CLONAZEPAM 1 MG: 1 TABLET ORAL at 12:19

## 2022-07-10 RX ADMIN — BUPRENORPHINE HYDROCHLORIDE, NALOXONE HYDROCHLORIDE 2 MG: 2; .5 FILM, SOLUBLE BUCCAL; SUBLINGUAL at 07:42

## 2022-07-10 RX ADMIN — BUPRENORPHINE AND NALOXONE 8 MG: 8; 2 FILM BUCCAL; SUBLINGUAL at 21:01

## 2022-07-10 RX ADMIN — BUPRENORPHINE HYDROCHLORIDE, NALOXONE HYDROCHLORIDE 2 MG: 2; .5 FILM, SOLUBLE BUCCAL; SUBLINGUAL at 11:59

## 2022-07-10 RX ADMIN — SODIUM CHLORIDE 100 ML/HR: 0.9 INJECTION, SOLUTION INTRAVENOUS at 09:18

## 2022-07-10 RX ADMIN — GABAPENTIN 300 MG: 300 CAPSULE ORAL at 00:10

## 2022-07-10 RX ADMIN — BUPRENORPHINE HYDROCHLORIDE, NALOXONE HYDROCHLORIDE 4 MG: 2; .5 FILM, SOLUBLE BUCCAL; SUBLINGUAL at 17:07

## 2022-07-10 RX ADMIN — BUPRENORPHINE HYDROCHLORIDE, NALOXONE HYDROCHLORIDE 2 MG: 2; .5 FILM, SOLUBLE BUCCAL; SUBLINGUAL at 14:17

## 2022-07-10 RX ADMIN — ENOXAPARIN SODIUM 40 MG: 100 INJECTION SUBCUTANEOUS at 08:03

## 2022-07-10 RX ADMIN — ACETAMINOPHEN 650 MG: 325 TABLET ORAL at 19:49

## 2022-07-10 RX ADMIN — ONDANSETRON 4 MG: 4 TABLET, ORALLY DISINTEGRATING ORAL at 05:01

## 2022-07-10 RX ADMIN — LOPERAMIDE HYDROCHLORIDE 4 MG: 2 CAPSULE ORAL at 19:46

## 2022-07-10 RX ADMIN — FLUTICASONE PROPIONATE 1 SPRAY: 50 SPRAY, METERED NASAL at 09:11

## 2022-07-10 RX ADMIN — GUAIFENESIN 600 MG: 600 TABLET, EXTENDED RELEASE ORAL at 08:04

## 2022-07-10 RX ADMIN — MELATONIN TAB 3 MG 6 MG: 3 TAB at 21:02

## 2022-07-10 NOTE — ASSESSMENT & PLAN NOTE
· Managed with 150 mcg levothyroxine daily  · Last TSH done more than 1 year ago  Reported symptoms of paresthesia, fatigue, and depression  · Obtained TSH 14 7  Adjusted dose to 175mcg      · Recommended repeating TSH in 4 weeks for further adjustment, patient requires PCP
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
## 2022-07-10 NOTE — UTILIZATION REVIEW
Initial Clinical Review    WAS OBSERVATION 07/09/2022 @ 1625, CONVERTED TO INPATIENT ADMISSION 07/10/2022 @ [phone], DUE TO CONTINUED STAY REQUIRED TO CARE FOR PATIENT WITH  Guilherme Le Guilherme Le Opioid Withdrawal       Admission: Date/Time/Statement:   Admission Orders (From admission, onward)     Ordered        07/10/22 0834  Inpatient Admission  Once            07/09/22 1625  Place in Observation  Once                      Orders Placed This Encounter   Procedures    Inpatient Admission     Standing Status:   Standing     Number of Occurrences:   1     Order Specific Question:   Level of Care     Answer:   Med Surg [16]     Order Specific Question:   Estimated length of stay     Answer:   More than 2 Midnights     Order Specific Question:   Certification     Answer:   I certify that inpatient services are medically necessary for this patient for a duration of greater than two midnights  See H&P and MD Progress Notes for additional information about the patient's course of treatment  ED Arrival Information     Expected   -    Arrival   7/9/2022 13:57    Acuity   Urgent            Means of arrival   Walk-In    Escorted by   Raul Chauhan [facility] Toxicology    Admission type   Urgent            Arrival complaint   detox            Chief Complaint   Patient presents with    Withdrawal - Drug     States she uses 20 bags of heroin per day with last use being Thursday  States that her skin feel numb throughout her body       Initial Presentation: 40 y o  female , presented to the ED @ [facility], from home via walk in  Admitted as Observation due to Opioid Withdrawal       Date: 07/09/2022   Requesting Detox  She states that she has been using 20 bags of heroin daily for several months  She admits to using ice at some point but denies any other drug or alcohol use recently    She states her last use was 1-2 days ago when over the past several hours she has been experiencing GI symptoms, diffuse myalgias arthralgias, and episodic patches of tingling  Will place 20 mcg transdermal buprenorphine patch x 2  Anticipate micro induction of 2 mg q 2 hours x4 doses in a m  Prior to transition on to maintenance dosing of 8/2 mg b i d  Continuous pulse oximetry  MEDICATION ASSISTED TREATMENT PATHWAY   Admit patient to medical detox unit and continue supportive care and stabilization of acute opioid withdrawal per medical toxicology/detox medication assisted treatment pathway  Monitor opioid severity via Clinical Opioid Withdrawal Scale (COWS) Q4 hours and administer buprenorphine/naloxone 8mg/2mg when COWS >8, or when greater than 24 hours have elapsed from most recent opioid use (excluding long-acting opioids, such as methadone)  Continue to monitor opioid severity Q30-60 minutes after first dose and administer additional buprenorphine 2-4mg every 30-60 minutes until COWS < 8 for two consecutive hours   (Max dose 32 mg)         Adjunctive medications administered as needed:  Clonidine 0 1 mg PO Q6 hours PRN anxiety or palpitations    Gabapentin 300mg PO Q8 hours PRN anxiety    Ibuprofen 600 mg PO Q6 hours PRN pain    Acetaminophen 1000mg PO Q8 hours PRN pain    Ondansetron 4 mg PO Q6 hours PRN N/V    Nicotine patch 7, 14, 21 mg  PRN nicotine withdrawal   Trazodone 50 mg PO QHS PRN sleep    Loperamide 4 mg PO PRN diarrhea up to 16 mg/day      COWS score:   Clinical Opiate Withdrawal Scale  Pulse: 84  Resting Pulse Rate: Measured After Patient is Sitting or Lying for One Minute: Pulse rate   GI Upset: Over Last Half Hour: Stomach cramps  Sweating: Over Past Half Hour Not Accounted for by Room Temperature of Patient Activity: Flushed or observable moistness on face  Tremor: Observation of Outstretched Hands: No tremor  Restlessness: Observation During Assessment: Able to sit still  Yawning: Observation During Assessment: No yawning  Pupil Size: Pupils pinned or normal size for room light  Anxiety and Irritability: Patient reports increasing irritability or anxiousness  Bone or Joint Aches: If Patient was Having Pain Previously, Only the Additional Component Attributed to Opiate Withdrawal is Scored: Mild diffuse discomfort  Gooseflesh Skin: Piloerection of skin can be felt or hairs standing up on arms  Runny Nose or Tearing: Not Accounted for by Cold Symptoms or Allergies: Nasal stuffiness of unusually moist eyes  Clinical Opiate Withdrawal Scale Total Score: 10    VTE Prophylaxis: Enoxaparin (Lovenox)  / sequential compression device     Day 1: 07/10/2022   Tolerating 20 mcg transdermal buprenorphine patch x 2  Began microinduction this AM, tolerating  Anticipate maintenance dosing 8/2mg BID this evening  Continuous pulse oximetry  PRN symptom management  Continue IV fld    Repeat CMP in AM      ED Triage Vitals   Temperature Pulse Respirations Blood Pressure SpO2   07/09/22 1411 07/09/22 1411 07/09/22 1411 07/09/22 1411 07/09/22 1411   98 3 °F (36 8 °C) 79 18 126/83 99 %      Temp Source Heart Rate Source Patient Position - Orthostatic VS BP Location FiO2 (%)   07/09/22 1411 07/09/22 1411 07/09/22 1411 07/09/22 1411 --   Oral Monitor Sitting Left arm       Pain Score       07/09/22 1743       5          Wt Readings from Last 1 Encounters:   07/09/22 61 2 kg (135 lb)     Additional Vital Signs:   Date/Time Temp Pulse Resp BP MAP (mmHg) SpO2 O2 Device Patient Position - Orthostatic VS   07/10/22 0700 99 °F (37 2 °C) 79 18 107/66 79 97 % None (Room air) Lying   07/10/22 0448 98 9 °F (37 2 °C) 79 16 109/68 81 99 % None (Room air) Lying   07/10/22 0300 -- 86 16 -- -- 95 % None (Room air) --   07/10/22 0004 -- 84 -- 110/67 -- -- -- --   07/09/22 2225 99 4 °F (37 4 °C) 67 13 98/65 76 99 % None (Room air) Lying   07/09/22 1937 99 1 °F (37 3 °C) 76 12 122/80 94 100 % None (Room air) Lying   07/09/22 1801 -- -- -- -- -- 98 % None (Room air) --   07/09/22 1743 98 6 °F (37 °C) 98 18 125/68 -- 97 % None (Room air) Sitting   07/09/22 1638 -- 85 18 131/71 -- 98 % None (Room air) Sitting   22 1524 -- 74 17 131/83 -- 99 % None (Room air) Sitting     Date and Time Eye Opening Best Verbal Response Best Motor Response [place] Coma Scale Score   22 1937 4 5 6 15   22 1520 4 5 6 15     2022 @ 1632  EC, Sinus Rhythm with short PA    Pertinent Labs/Diagnostic Test Results:   Results from last 7 days   Lab Units 22  1523   SARS-COV-2  Negative     Results from last 7 days   Lab Units 07/10/22  0458 22  1523   WBC Thousand/uL 6 35 8 04   HEMOGLOBIN g/dL 11 9 13 0   HEMATOCRIT % 37 1 41 7   PLATELETS Thousands/uL 370 438*   NEUTROS ABS Thousands/µL  --  3 89     Results from last 7 days   Lab Units 07/10/22  0458 22  1523   SODIUM mmol/L 138 140   POTASSIUM mmol/L 3 7 4 0   CHLORIDE mmol/L 107 101   CO2 mmol/L 22 29   ANION GAP mmol/L 9 10   BUN mg/dL 10 12   CREATININE mg/dL 0 65 0 70   EGFR ml/min/1 73sq m 110 107   CALCIUM mg/dL 8 7 10 0   MAGNESIUM mg/dL  --  2 1     Results from last 7 days   Lab Units 07/10/22  0458 22  1523   AST U/L 40* 58*   ALT U/L 49* 70*   ALK PHOS U/L 62 71   TOTAL PROTEIN g/dL 6 7 8 9*   ALBUMIN g/dL 3 7 4 9   TOTAL BILIRUBIN mg/dL 0 62 0 81     Results from last 7 days   Lab Units 07/10/22  0458 22  1523   GLUCOSE RANDOM mg/dL 100* 101*     Results from last 7 days   Lab Units 07/10/22  0745 22  1718   TSH 3RD GENERATON uIU/mL 10 500* 14 700*     Results from last 7 days   Lab Units 22  1705   CLARITY UA  Cloudy*   COLOR UA  Karen*   SPEC GRAV UA  1 020   PH UA  6 0   GLUCOSE UA mg/dl Negative   KETONES UA mg/dl Negative   BLOOD UA  25 0*   PROTEIN UA mg/dl 15 (Trace)*   NITRITE UA  Negative   BILIRUBIN UA  Negative   UROBILINOGEN UA mg/dL Negative   LEUKOCYTES UA  500 0*   WBC UA /hpf 10-20*   RBC UA /hpf 0-1   BACTERIA UA /hpf Innumerable*   EPITHELIAL CELLS WET PREP /hpf Moderate*   MUCUS THREADS  Occasional*     Results from last 7 days   Lab Units 22  1701 AMPH/METH  Positive*   BARBITURATE UR  Negative   BENZODIAZEPINE UR  Negative   COCAINE UR  Positive*   METHADONE URINE  Negative   OPIATE UR  Negative   PCP UR  Negative   THC UR  Negative     Results from last 7 days   Lab Units 07/09/22  1523   ETHANOL LVL mg/dL <10     ED Treatment:   Medication Administration from 07/09/2022 1357 to 07/09/2022 1741       Date/Time Order Dose Route Action     07/09/2022 1523 sodium chloride 0 9 % bolus 1,000 mL 1,000 mL Intravenous New Bag        Past Medical History:   Diagnosis Date    Disease of thyroid gland     Hypothyroid     Rib fracture     Scoliosis      Present on Admission:   Opioid withdrawal (HCC)   Opioid use disorder, moderate, dependence (HCC)   Transaminitis   Tobacco use disorder   Hypothyroidism   Bacteriuria      Admitting Diagnosis: Drug withdrawal ([facility]) [F19 239]  Heroin abuse ([facility] ) [F11 10]  Opioid use disorder, severe, dependence ([facility] ) [F11 20]  Admitted to substance misuse detoxification center [Z78 9]  Age/Sex: 40 y o  female  Admission Orders:  Regular Diet  Up & OOB as tolerated  Continuous pulse ox  Heri SCDs      Scheduled Medications:  buprenorphine-naloxone, 2 mg, Sublingual, Q2H  buprenorphine-naloxone, 4 mg, Sublingual, Daily  buprenorphine-naloxone, 8 mg, Sublingual, BID  enoxaparin, 40 mg, Subcutaneous, Daily  fluticasone, 1 spray, Each Nare, Daily  guaiFENesin, 600 mg, Oral, Q12H [address]  levothyroxine, 175 mcg, Oral, Early Morning  melatonin, 6 mg, Oral, HS  nicotine, 21 mg, Transdermal, Daily  transdermal buprenorphine, 20 mcg, Transdermal, Q7 Days  transdermal buprenorphine, 20 mcg, Transdermal, Q7 Days      Continuous IV Infusions:  sodium chloride, 100 mL/hr, Intravenous, Continuous      PRN Meds:  acetaminophen, 650 mg, Oral, Q6H PRN  clonazePAM, 1 mg, Oral, Q8H PRN  cloNIDine, 0 1 mg, Oral, Q6H PRN  gabapentin, 300 mg, Oral, TID PRN  ondansetron, 4 mg, Oral, Q6H PRN        IP CONSULT TO CASE MANAGEMENT    Network Utilization Review Department  ATTENTION: Please call with any questions or concerns to [phone] and carefully listen to the prompts so that you are directed to the right person  All voicemails are confidential   Nellie Velasquez all requests for admission clinical reviews, approved or denied determinations and any other requests to dedicated fax number below belonging to the campus where the patient is receiving treatment   List of dedicated fax numbers for the Facilities:  [address] DENIALS (Administrative/Medical Necessity) [phone]   [address] (Maternity/NICU/Pediatrics) [phone]   [address]  [address] Medical [place] [phone] [place] [address] Ozzy Clotilde Rhodes 1481 [address] [place] [address] [phone]

## 2022-07-10 NOTE — ASSESSMENT & PLAN NOTE
· Trending down and improving from initial admission serum labs  · Patient denies any history of hepatitis that she is aware of  · HIV 1/2 negative, hepatitis C- high reactive  · Patient informed of findings - recommended GI follow up upon discharge
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
## 2022-07-10 NOTE — ASSESSMENT & PLAN NOTE
· Began microinduction this AM, tolerating  Anticipate maintenance dosing 8/2mg BID this evening     · Continuous pulse oximetry   · PRN symptom management

## 2022-07-10 NOTE — ASSESSMENT & PLAN NOTE
· Upon admission UA positive for leukocytes and blood  No nitrites  · Patient endorses mild urinary frequency, denies gross hematuria, discharge  Back pain at baseline  VS stable, afebrile, non toxic appearing  · Will send for culture and determine need for abx treatment      · UA culture pending

## 2022-07-10 NOTE — DISCHARGE INSTR - OTHER ORDERS
Drug and Alcohol Resources in [facility]    If you have health insurance, including medical assistance, there should be a phone number on your insurance card that you can call to find out how to access services  The card may say, For [address] or For Drug and Alcohol Services or For Substance Abuse Services call the number provided  Or contact [phone]    [facility] ([facility])  The [facility] provides care management for adults with Opioid Use Disorder  The [facility] has a team of care managers who will help individuals get into treatment, coordinate behavioral and physical health care, and provide recovery support and guidance  Care managers will also provide information about Medication-Assisted Treatment  Contact [phone]    [facility] Drug and Alcohol Administration [phone] For additional information, contact the Department of Human Services Information and Referral Unit at [phone] between the hours of 8:30 a m  and 4:30 p m , Monday through Friday  Recovery Centers  These centers offer a safe, sober environment to those in recovery  A variety of programming including 12-Step Meetings, Michiel Limbo, Life Skills Workshops, etc  is offered at each location  Recovery Centers  These centers offer a safe, sober environment to those in recovery  A variety of programming including 12-Step Meetings, Michiel Limbo, Life Skills Workshops, etc  is offered at each location  [address]  [phone]  [email or web address]  [email or web address] [address] [employer]  Sudhir Ortiz, [address]  [phone]  [email or web address]    [address] [facility]  [phone]   [address] [place] [address]  [phone]  www  [email or web address], [address] [place] [address]  [phone]  [facility]  LAINEY BUITRAGO [facility] REHABILITATION is [phone] [place], [place]  Whitney Abi [place], Thursday from 1pm-5pm and Friday, Saturday from 11am-3pm    [address]  Confidential free help, from public health agencies, to find substance use treatment and information  [phone]    Link for Zoom Codes for Virtual 12 step Meetings PodManda tn  aspx    AA [facility]  If you feel you have a problem with alcohol, or if you simply want to know more about AA, call our 24-hour hotline at: [address]: [phone]    [address] Meetings can be found at [email or web address] [email or web address]-wood [email or web address]  NA Meeting list [email or web address]    [facility] mental health resources      Crisis Intervention  24 hour Emergency [address]  Call [phone]  Outside of [facility]: call [phone]-[phone] ([phone]) [phone] mental health: Information & Referral at [phone]  [facility]  [address], [place], [address] [facility] [phone] drop in center  [address] [address]  [address] [place] [address] [address]  [phone]

## 2022-07-11 VITALS
BODY MASS INDEX: 23.05 KG/M2 | WEIGHT: 135 LBS | TEMPERATURE: 99.4 F | RESPIRATION RATE: 16 BRPM | HEIGHT: 64 IN | DIASTOLIC BLOOD PRESSURE: 62 MMHG | SYSTOLIC BLOOD PRESSURE: 103 MMHG | OXYGEN SATURATION: 97 % | HEART RATE: 86 BPM

## 2022-07-11 LAB
ALBUMIN SERPL BCP-MCNC: 3.7 G/DL (ref 3–5.2)
ALP SERPL-CCNC: 52 U/L (ref 43–122)
ALT SERPL W P-5'-P-CCNC: 43 U/L
ANION GAP SERPL CALCULATED.3IONS-SCNC: 6 MMOL/L (ref 5–14)
AST SERPL W P-5'-P-CCNC: 29 U/L (ref 14–36)
BILIRUB SERPL-MCNC: 0.44 MG/DL
BUN SERPL-MCNC: 5 MG/DL (ref 5–25)
CALCIUM SERPL-MCNC: 8.6 MG/DL (ref 8.4–10.2)
CHLORIDE SERPL-SCNC: 109 MMOL/L (ref 97–108)
CO2 SERPL-SCNC: 23 MMOL/L (ref 22–30)
CREAT SERPL-MCNC: 0.6 MG/DL (ref 0.6–1.2)
ERYTHROCYTE [DISTWIDTH] IN BLOOD BY AUTOMATED COUNT: 14.4 % (ref 11.6–15.1)
GFR SERPL CREATININE-BSD FRML MDRD: 113 ML/MIN/1.73SQ M
GLUCOSE SERPL-MCNC: 95 MG/DL (ref 70–99)
HAV IGM SER QL: ABNORMAL
HBV CORE IGM SER QL: ABNORMAL
HBV SURFACE AG SER QL: ABNORMAL
HCT VFR BLD AUTO: 36.2 % (ref 34.8–46.1)
HCV AB SER QL: ABNORMAL
HGB BLD-MCNC: 11.8 G/DL (ref 11.5–15.4)
MCH RBC QN AUTO: 27.4 PG (ref 26.8–34.3)
MCHC RBC AUTO-ENTMCNC: 32.6 G/DL (ref 31.4–37.4)
MCV RBC AUTO: 84 FL (ref 82–98)
PLATELET # BLD AUTO: 370 THOUSANDS/UL (ref 149–390)
PMV BLD AUTO: 9.8 FL (ref 8.9–12.7)
POTASSIUM SERPL-SCNC: 3.7 MMOL/L (ref 3.6–5)
PROT SERPL-MCNC: 6.6 G/DL (ref 5.9–8.4)
RBC # BLD AUTO: 4.3 MILLION/UL (ref 3.81–5.12)
SODIUM SERPL-SCNC: 138 MMOL/L (ref 137–147)
WBC # BLD AUTO: 6.95 THOUSAND/UL (ref 4.31–10.16)

## 2022-07-11 PROCEDURE — 99239 HOSP IP/OBS DSCHRG MGMT >30: CPT

## 2022-07-11 PROCEDURE — 80053 COMPREHEN METABOLIC PANEL: CPT | Performed by: NURSE PRACTITIONER

## 2022-07-11 PROCEDURE — 85027 COMPLETE CBC AUTOMATED: CPT | Performed by: NURSE PRACTITIONER

## 2022-07-11 RX ORDER — CEPHALEXIN 500 MG/1
500 CAPSULE ORAL EVERY 12 HOURS SCHEDULED
Qty: 10 CAPSULE | Refills: 0 | Status: SHIPPED | OUTPATIENT
Start: 2022-07-11 | End: 2022-07-16

## 2022-07-11 RX ORDER — CEPHALEXIN 500 MG/1
500 CAPSULE ORAL EVERY 12 HOURS SCHEDULED
Status: DISCONTINUED | OUTPATIENT
Start: 2022-07-11 | End: 2022-07-11 | Stop reason: HOSPADM

## 2022-07-11 RX ORDER — LEVOTHYROXINE SODIUM 175 UG/1
175 TABLET ORAL
Qty: 30 TABLET | Refills: 0 | Status: SHIPPED | OUTPATIENT
Start: 2022-07-12 | End: 2022-08-11

## 2022-07-11 RX ADMIN — FLUTICASONE PROPIONATE 1 SPRAY: 50 SPRAY, METERED NASAL at 08:11

## 2022-07-11 RX ADMIN — GUAIFENESIN 600 MG: 600 TABLET, EXTENDED RELEASE ORAL at 08:11

## 2022-07-11 RX ADMIN — BUPRENORPHINE AND NALOXONE 8 MG: 8; 2 FILM BUCCAL; SUBLINGUAL at 08:11

## 2022-07-11 RX ADMIN — CLONAZEPAM 1 MG: 1 TABLET ORAL at 08:19

## 2022-07-11 RX ADMIN — LEVOTHYROXINE SODIUM 175 MCG: 100 TABLET ORAL at 05:31

## 2022-07-11 RX ADMIN — SODIUM CHLORIDE 1000 ML: 0.9 INJECTION, SOLUTION INTRAVENOUS at 06:48

## 2022-07-11 RX ADMIN — SODIUM CHLORIDE 100 ML/HR: 0.9 INJECTION, SOLUTION INTRAVENOUS at 04:14

## 2022-07-11 RX ADMIN — GABAPENTIN 300 MG: 300 CAPSULE ORAL at 08:19

## 2022-07-11 RX ADMIN — ENOXAPARIN SODIUM 40 MG: 100 INJECTION SUBCUTANEOUS at 08:11

## 2022-07-11 RX ADMIN — NICOTINE 21 MG: 21 PATCH, EXTENDED RELEASE TRANSDERMAL at 08:12

## 2022-07-11 NOTE — CASE MANAGEMENT
Cm met with pt and reviewed referral for SHARE program and the need to meet with SHARE CM to continue to complete MA application  Cm discussed the use of the 30 pill zubsolv coupons  Pt continues to be agreeable to this plan  Cm contacted SHARE program and pt was scheduled an appointment with CM for 7/15/2022
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
## 2022-07-11 NOTE — QUICK NOTE
Upon discharge patient urine culture resulted with findings of +10,000 e coli  Attempted to contact patient to advise her that there will be an antibiotic called into [address] (Keflex 500 mg b i d x 5 days) for UTI  Patient did not answer phone

## 2022-07-11 NOTE — CASE MANAGEMENT
Case Management Discharge Planning Note    Patient name Latricia Aceves  Location 5T DETOX 513/5T DETOX 51* MRN [id number]  : 1980 Date 2022       Current Admission Date: 2022  Current Admission Diagnosis:Opioid withdrawal [facility])   Patient Active Problem List    Diagnosis Date Noted    Opioid withdrawal ([facility] [address] ) 2022    Opioid use disorder, moderate, dependence ([facility] [address] ) 2022    Transaminitis 2022    Tobacco use disorder 2022    Hypothyroidism 2022    Bacteriuria 2022      LOS (days): 1  Geometric Mean LOS (GMLOS) (days):   Days to GMLOS:     OBJECTIVE:  Risk of Unplanned Readmission Score: 12 27         Current admission status: Inpatient   Preferred Pharmacy:   [address] [place] [address] [facility] [address]  Phone: [phone] Fax: [phone] [address] [facility],[address], [address] [facility] [address] [facility]  [address] [address]  Phone: [phone] Fax: [phone]    Primary Care Provider: No primary care provider on file  Primary Insurance:   Secondary Insurance:     DISCHARGE DETAILS:Cm informed by medical staff pt requesting discharge today  Cm faxed two zubsolv coupons to [address]  Cm met with pt and pt agreeable to continue case management and MAT with [facility] program   Pt states she will retreive medications from pharmacy herself  Pt will be discharged home today      Discharge planning discussed with[de-identified] patient  Freedom of Choice: Yes                   Contacts  Patient Contacts: [facility] program  Relationship to Patient[de-identified] Treatment Provider  Contact Method: Phone, Fax  Reason/Outcome: Discharge Planning              Other Referral/Resources/Interventions Provided:  Referrals Provided[de-identified] IOP, Crisis Hotline, Support Group (inpatient substance use treatment)    Would you like to participate in our [address] service program?  : Yes Family notified[de-identified] No SHOAIB's signed

## 2022-07-11 NOTE — NURSING NOTE
Patient was given belongings  AVS was reviewed with this RN  Patient was escorted to front lobby without incident

## 2022-07-12 LAB — BACTERIA UR CULT: ABNORMAL

## 2022-07-15 ENCOUNTER — DOCUMENTATION (OUTPATIENT)
Dept: PSYCHIATRY | Facility: CLINIC | Age: 42
End: 2022-07-15
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
## 2022-07-21 NOTE — UTILIZATION REVIEW
URGENT/EMERGENT  INPATIENT/SPU AUTHORIZATION REQUEST    Date: 07/21/22            # Pages in this Request:     X New Request   Additional Information for PA#:     Office Contact Name:  Teresa Garcia Title: Utilization Review, [facility] Nurse     Phone: [phone]  Ext  Availability (Date/Time): Wednesday - Friday 8 am- 4 pm    x Inpatient Review  SPU Review        Current       x Late Pick-up   · How your facility was first notified of the Late Pick-up: Path[facility] Letter   · When your facility was first notified of the Late Pick-up (date): 7/19/22         RECIPIENT INFORMATION    Recipient ID#: [id number]    Recipient Name: Nicole Laird      YOB: 1980  40 y o  Recipient Alias:     Gender:  X Male  Female Medicaid Eligibility ([address] [place] [address]): INSURANCE INFORMATION    (All other private or governmental health insurance benefits must be utilized prior to billing the [facility] Program)    Was this admission the result of an MVA, other accident, assault, injury, fall, gunshot, bite etc ? Yes x No                   If yes, provide a brief description of the incident  Does the recipient have other insurance coverage? Yes x No        Insurance Company Name/Policy #      Did that insurance pay on this claim? Yes  No        Did that insurance deny this claim? Yes  No    If yes, reason for denial:      Does the recipient have Medicare? Yes x No        Did Medicare exhaust prior to this admission? Yes  No        Did Medicare partially pay this claim? Yes  No        Did that insurance deny this claim? Yes  No    If yes, reason for denial:          Was the recipient a prisoner at the time of admission?   Yes x No            PROVIDER INFORMATION    [facility]  Provider ID#: [id number]    [address] [place] [address] Physician Name: [address] ED MD ( ) [employer] Provider ID#: [id number]        ADMISSION INFORMATION    Type of Admission: (please choose one)    X ED Direct    If yes, from where? Transfer    If yes, transferring hospital (inpatient, rehab, or psych) Provider Name/Provider ID#: Admission Floor or Unit Type: Med Surg     Dates/Times:        ED Date/Time: 7/9/2022  2:50 PM        Observation Date/Time: 7/9/22  16:25        Admission Date/Time: 7/10/22  8:34 AM        Discharge or Transfer Date/Time: 7/11/2022 11:50 AM        DIAGNOSIS/PROCEDURE CODES    Primary Diagnosis Code/Primary Diagnosis Code description:  F19 239 Other psychoactive substance dependence with withdrawal, unspecified   F11 10 Opioid abuse, uncomplicated   G79 97 Opioid dependence, uncomplicated   W87 4 Other specified health status     Additional Diagnosis Code(s) and Description(s)-(up to three additional codes):    Procedure Code (one) and description:        CLINICAL INFORMATION - PRIOR ADMISSION ONLY    Is there a prior admission with a discharge date within 30 days of the date of this admission? X No (Proceed to the next section - "Clinical Information - General Review Checklist:)      Yes (Provide the following information)     Prior admission dates:    MA Prior Authorization Number:        Review Outcome:     Diagnosis Code(s)/Description:    Procedure Code/Description:    Findings:    Treatment:    Condition on Discharge:   Vitals:    Labs:   Imaging:   Medications: Follow-up Instructions:    Disposition:        CLINICAL INFORMATION - GENERAL REVIEW CHECKLIST    EMERGENCY DEPARTMENT: (Proceed to "ADMISSION" if Direct Admission)    Presenting Signs/Symptoms:  40 y o  female , presented to the ED @ [address] [place] [address], from home via walk in  Admitted as Observation due to Opioid Withdrawal        Date: 07/09/2022   Requesting Detox    She states that she has been using 20 bags of heroin daily for several months   She admits to using ice at some point but denies any other drug or alcohol use recently  Juan Manuel Urbano states her last use was 1-2 days ago when over the past several hours she has been experiencing GI symptoms, diffuse myalgias arthralgias, and episodic patches of tingling  Will place 20 mcg transdermal buprenorphine patch x 2  Anticipate micro induction of 2 mg q 2 hours x4 doses in a m  Prior to transition on to maintenance dosing of 8/2 mg b i d  Continuous pulse oximetry        Medication/treatment prior to arrival in the ED:    Past Medical History:    Clinical Exam:    Initial Vital Signs: (Temp, Pulse, Resp, and BP)   ED Triage Vitals   Temperature Pulse Respirations Blood Pressure SpO2   07/09/22 1411 07/09/22 1411 07/09/22 1411 07/09/22 1411 07/09/22 1411   98 3 °F (36 8 °C) 79 18 126/83 99 %      Temp Source Heart Rate Source Patient Position - Orthostatic VS BP Location FiO2 (%)   07/09/22 1411 07/09/22 1411 07/09/22 1411 07/09/22 1411 --   Oral Monitor Sitting Left arm       Pain Score       07/09/22 1743       5           Pertinent Repeat Vital Signs: (include times they were obtained)  Date/Time Temp Pulse Resp BP MAP (mmHg) SpO2 O2 Device Patient Position - Orthostatic VS   07/10/22 0700 99 °F (37 2 °C) 79 18 107/66 79 97 % None (Room air) Lying   07/10/22 0448 98 9 °F (37 2 °C) 79 16 109/68 81 99 % None (Room air) Lying   07/10/22 0300 -- 86 16 -- -- 95 % None (Room air) --   07/10/22 0004 -- 84 -- 110/67 -- -- -- --   07/09/22 2225 99 4 °F (37 4 °C) 67 13 98/65 76 99 % None (Room air) Lying   07/09/22 1937 99 1 °F (37 3 °C) 76 12 122/80 94 100 % None (Room air) Lying   07/09/22 1801 -- -- -- -- -- 98 % None (Room air) --   07/09/22 1743 98 6 °F (37 °C) 98 18 125/68 -- 97 % None (Room air) Sitting   07/09/22 1638 -- 85 18 131/71 -- 98 % None (Room air) Sitting   07/09/22 1524 -- 74 17 131/83 -- 99 % None (Room air) Sitting       Pertinent Sustained Findings: (include times they were obtained)  Date and Time Eye Opening Best Verbal Response Best Motor Response [place] Coma Scale Score   07/09/22 1937 4 5 6 15   07/09/22 1520 4 5 6 15       Weight in Kilograms:  Wt Readings from Last 1 Encounters:   07/09/22 61 2 kg (135 lb)       Pertinent Labs (results):  Results from last 7 days   Lab Units 07/09/22  1523   SARS-COV-2   Negative            Results from last 7 days   Lab Units 07/10/22  0458 07/09/22  1523   WBC Thousand/uL 6 35 8 04   HEMOGLOBIN g/dL 11 9 13 0   HEMATOCRIT % 37 1 41 7   PLATELETS Thousands/uL 370 438*   NEUTROS ABS Thousands/µL  --  3 89            Results from last 7 days   Lab Units 07/10/22  0458 07/09/22  1523   SODIUM mmol/L 138 140   POTASSIUM mmol/L 3 7 4 0   CHLORIDE mmol/L 107 101   CO2 mmol/L 22 29   ANION GAP mmol/L 9 10   BUN mg/dL 10 12   CREATININE mg/dL 0 65 0 70   EGFR ml/min/1 73sq m 110 107   CALCIUM mg/dL 8 7 10 0   MAGNESIUM mg/dL  --  2 1            Results from last 7 days   Lab Units 07/10/22  0458 07/09/22  1523   AST U/L 40* 58*   ALT U/L 49* 70*   ALK PHOS U/L 62 71   TOTAL PROTEIN g/dL 6 7 8 9*   ALBUMIN g/dL 3 7 4 9   TOTAL BILIRUBIN mg/dL 0 62 0 81            Results from last 7 days   Lab Units 07/10/22  0458 07/09/22  1523   GLUCOSE RANDOM mg/dL 100* 101*            Results from last 7 days   Lab Units 07/10/22  0745 07/09/22  1718   TSH 3RD GENERATON uIU/mL 10 500* 14 700*           Results from last 7 days   Lab Units 07/09/22  1705   CLARITY UA   Cloudy*   COLOR UA   Karen*   SPEC GRAV UA   1 020   PH UA   6 0   GLUCOSE UA mg/dl Negative   KETONES UA mg/dl Negative   BLOOD UA   25 0*   PROTEIN UA mg/dl 15 (Trace)*   NITRITE UA   Negative   BILIRUBIN UA   Negative   UROBILINOGEN UA mg/dL Negative   LEUKOCYTES UA   500 0*   WBC UA /hpf 10-20*   RBC UA /hpf 0-1   BACTERIA UA /hpf Innumerable*   EPITHELIAL CELLS WET PREP /hpf Moderate*   MUCUS THREADS   Occasional*           Results from last 7 days   Lab Units 07/09/22  1705   AMPH/METH   Positive*   BARBITURATE UR   Negative   BENZODIAZEPINE UR   Negative   COCAINE UR   Positive*   METHADONE URINE   Negative   OPIATE UR   Negative   PCP UR   Negative   THC UR   Negative     Results from last 7 days   Lab Units 22  1523   ETHANOL LVL mg/dL <10        Radiology (results):    EKG (results):   2022 @ 1632  EC, Sinus Rhythm with short SC      Other tests (results):    Tests pending final results:    Treatment in the ED:   Medication Administration from 2022 1357 to 2022 1741       Date/Time Order Dose Route Action Comments     2022 1523 sodium chloride 0 9 % bolus 1,000 mL 1,000 mL Intravenous New Bag            Other treatments:      Change in condition while in the ED:     Response to ED Treatment:          OBSERVATION: (Proceed to "ADMISSION" if Direct Admission)    Orders written during the observation period  Regular Diet  Up & OOB as tolerated  Continuous pulse ox  Heri SCDs        Meds Name, dose, route, time, how may doses given:  buprenorphine-naloxone, 2 mg, Sublingual, Q2H - started 7/10 @ 07:42  buprenorphine-naloxone, 4 mg, Sublingual, Daily - given 7/10 x 1   enoxaparin, 40 mg, Subcutaneous, Daily  fluticasone, 1 spray, Each Nare, Daily  guaiFENesin, 600 mg, Oral, Q12H [address]  levothyroxine, 175 mcg, Oral, Early Morning  Melatonin 6mg po HS   nicotine, 21 mg, Transdermal, Daily  transdermal buprenorphine, 20 mcg, Transdermal, Q7 Days - placed on   transdermal buprenorphine, 20 mcg, Transdermal, Q7 Days - placed on         PRN Meds Name, dose, route, time, how many doses given within the first 24 hrs :    acetaminophen, 650 mg, Oral, Q6H PRN  clonazePAM, 1 mg, Oral, Q8H PRN  cloNIDine, 0 1 mg, Oral, Q6H PRN  gabapentin, 300 mg, Oral, TID PRN  ondansetron, 4 mg, Oral, Q6H PRN       IVs Type, rate, and total amt  Ordered/given:    sodium chloride, 100 mL/hr, Intravenous, Continuous       Labs, imaging, other:  Consults and findings:    Test Results during the observation period  Pertinent Lab tests (dates/results):  Culture results (blood, urine, spinal, wound, respiratory, etc ):  Imaging tests (dates/results):  EKG (dates/results):   Other test (dates/results):  Tests pending (dates/results):    Surgical or Invasive Procedures during the observation period  Name of surgery/procedure:  Date & Time:  Patient Response:  Post-operative orders:  Operative Report/Findings:    Response to Treatment, Major Change in Condition, Major Charge in Treatment during the observation period  Day 1: 07/10/2022   Tolerating 20 mcg transdermal buprenorphine patch x 2  Began microinduction this AM, tolerating   Anticipate maintenance dosing 8/2mg BID this evening  Continuous pulse oximetry  PRN symptom management  Continue IV fld  Repeat CMP in AM          ADMISSION:    DIRECT Admissions Only:    · Presenting Signs/Symptoms:   ·   · Medication/treatment prior to arrival:  ·   · Past Medical History:  ·   · Clinical Exam on admission:  ·   · Vital Signs on admission: (Temp, Pulse, Resp, and BP)  ·   · Weight in kilograms:     ALL Admissions:    Admission Orders and Other Orders written within the first 24 hrs after admission  Meds Name, dose, route, time, how may doses given:    buprenorphine-naloxone, 2 mg, Sublingual, Q2H  buprenorphine-naloxone, 8 mg, Sublingual, BID - started 7/10 @ 21:01  enoxaparin, 40 mg, Subcutaneous, Daily  fluticasone, 1 spray, Each Nare, Daily  guaiFENesin, 600 mg, Oral, Q12H [address]  levothyroxine, 175 mcg, Oral, Early Morning  melatonin, 6 mg, Oral, HS  nicotine, 21 mg, Transdermal, Daily  transdermal buprenorphine, 20 mcg, Transdermal, Q7 Days  transdermal buprenorphine, 20 mcg, Transdermal, Q7 Days  Lopermide 4 mg po once - 7/10 x1          PRN Meds Name, dose, route, time, how many doses given within the first 24 hrs :    acetaminophen, 650 mg, Oral, Q6H PRN  clonazePAM, 1 mg, Oral, Q8H PRN  cloNIDine, 0 1 mg, Oral, Q6H PRN  gabapentin, 300 mg, Oral, TID PRN  ondansetron, 4 mg, Oral, Q6H PRN         IVs Type, rate, and total amt   Ordered/given:    sodium chloride, 100 mL/hr, Intravenous, Continuous    Labs, imaging, other:  Clinical Opiate Withdrawal Scale    Row Name 07/11/22 0700 07/11/22 0413 07/10/22 1900 07/10/22 1520 07/10/22 1400   Clinical Opiate Withdrawal Scale   Pulse 86  -SD 91  -NT 88  -YR 82  -DW 83  -MD   Resting Pulse Rate: Measured After Patient is Sitting or Lying for One Minute -- -- -- -- 1  -MD   GI Upset: Over Last Half Hour -- -- -- -- 2  -MD   Sweating: Over Past Half Hour Not Accounted for by Room Temperature of Patient Activity -- -- -- -- 0  -MD   Tremor: Observation of Outstretched Hands -- -- -- -- 1  -MD   Restlessness: Observation During Assessment -- -- -- -- 1  -MD   Yawning: Observation During Assessment -- -- -- -- 0  -MD   Pupil Size -- -- -- -- 0  -MD   Anxiety and Irritability -- -- -- -- 2  -MD   Bone or Joint Aches: If Patient was Having Pain Previously, Only the Additional Component Attributed to Opiate Withdrawal is Scored -- -- -- -- 1  -MD   Gooseflesh Skin -- -- -- -- 0  -MD   Runny Nose or Tearing: Not Accounted for by Cold Symptoms or Allergies -- -- -- -- 0  -MD   Clinical Opiate Withdrawal Scale Total Score -- -- -- -- 8  -MD   Heart Rate Source -- Monitor  -NT -- -- --   Patient Position - Orthostatic VS Lying  -SD Lying  -NT Sitting  -YR Lying  -DW --   Row Name 07/10/22 1200 07/10/22 1000 07/10/22 0700 07/10/22 0448 07/10/22 0300   Clinical Opiate Withdrawal Scale   Pulse 80  -MD 78  -MD 79  -DD 79  -NT 86  -NT   Resting Pulse Rate: Measured After Patient is Sitting or Lying for One Minute 0  -MD 0  -MD -- 0  -NT --   GI Upset: Over Last Half Hour 0  -MD 1  -MD -- 2  -NT --   Sweating: Over Past Half Hour Not Accounted for by Room Temperature of Patient Activity 0  -MD 0  -MD -- 0  -NT --   Tremor: Observation of Outstretched Hands 1  -MD 2  -MD -- 2  -NT --   Restlessness: Observation During Assessment 0  -MD 0  -MD -- 0  -NT --   Yawning: Observation During Assessment 0  -MD 0  -MD -- 1  -NT --   Pupil Size 0  -MD 0  -MD -- 0  -NT --   Anxiety and Irritability 2  -MD 1  -MD -- 1  -NT -- Bone or Joint Aches: If Patient was Having Pain Previously, Only the Additional Component Attributed to Opiate Withdrawal is Scored 1  -MD 1  -MD -- 0  -NT --   Gooseflesh Skin 0  -MD 0  -MD -- 3  -NT --   Runny Nose or Tearing: Not Accounted for by Cold Symptoms or Allergies 0  -MD 0  -MD -- 1  -NT --   Clinical Opiate Withdrawal Scale Total Score 4  -MD 5  -MD -- 10  -NT --   Heart Rate Source -- -- -- Monitor  -NT Monitor  -NT   Patient Position - Orthostatic VS -- -- Lying  -DD Lying  -NT --   Row Name 07/10/22 0004 07/09/22 2225 07/09/22 1937 07/09/22 1753 07/09/22 1743   Clinical Opiate Withdrawal Scale   Pulse 84  -RG 67  -NT 76  -NT -- 98  -LL   Resting Pulse Rate: Measured After Patient is Sitting or Lying for One Minute 1  -RG 0  -NT 0  -NT 1  -LL --   GI Upset: Over Last Half Hour 1  -RG 1  -NT 1  -NT 3  -LL --   Sweating: Over Past Half Hour Not Accounted for by Room Temperature of Patient Activity 2  -RG 0  -NT 0  -NT 2  -LL --   Tremor: Observation of Outstretched Hands 0  -RG 0  -NT 0  -NT 2  -LL --   Restlessness: Observation During Assessment 0  -RG 0  -NT 0  -NT 1  -LL --   Yawning: Observation During Assessment 0  -RG 1  -NT 1  -NT 0  -LL --   Pupil Size 0  -RG 0  -NT 0  -NT 0  -LL --   Anxiety and Irritability 1  -RG 1  -NT 1  -NT 2  -LL --   Bone or Joint Aches: If Patient was Having Pain Previously, Only the Additional Component Attributed to Opiate Withdrawal is Scored 1  -RG 0  -NT 0  -NT 2  -LL --   Gooseflesh Skin 3  -RG 3  -NT 3  -NT 3  -LL --   Runny Nose or Tearing: Not Accounted for by Cold Symptoms or Allergies 1  -RG 1  -NT 1  -NT 1  -LL --   Clinical Opiate Withdrawal Scale Total Score 10  -RG 7  -NT 7  -NT 17  -LL --         Consults and findings:    Test Results after admission  Pertinent Lab tests (dates/results):  Culture results (blood, urine, spinal, wound, respiratory, etc ):  Imaging tests (dates/results):  EKG (dates/results):   Other test (dates/results):  Tests pending (dates/results):    Surgical or Invasive Procedures  Name of surgery/procedure:  Date & Time:  Patient Response:  Post-operative orders:  Operative Report/Findings:    Response to Treatment, Major Change in Condition, Major Charge in Treatment anytime during admission  Hospital Course:   Abimbola Dang is a 40 y o  female patient who originally presented to the hospital on 7/9/2022 due to acute opioid withdrawal  Patient reported daily fentanyl use intravenously and via insufflation  Upon admission to medical detox unit patient had opioid withdrawal symptoms of restlessness, anxiety, and myalgias/arthralgias  She was started on 40 mcg transdermal buprenorphine patch for mild withdrawal symptoms  As her withdrawal symptoms progressed patient began micro-induction of 2 mg q 2 hours x 4 doses on 7/10/22 before transitioning to maintenance dosing of 8 mg of buprenorphine-naloxone b i d  Patient was found to have incidental findings of elevated TSH with known history of hypothyroidism, levothyroxine was restarted  She was screened for HIV and acute hepatitis as patient had a history of IV drug use  Hepatitis C- high reactive, patient informed of finding and need to follow up with outpatient GI upon discharge  Patient will continue with MAT, scheduled for [facility] program        Disposition on Discharge  Home, Rehab, SNF, LTC, Shelter, etc : Home/Self Care    Cease to Breathe (CTB)  If a patient expires during an admission, in addition to the above information, please include:    Summary/timeline of the patient's decline in condition:    Medications and treatment:    Patient response to treatment:    Date and time patient ceased to breathe:        Is there a Readmission that follows this admission?    Yes X No    If yes, provide dates:          InterQual Review    InterQual Criteria Met: X Yes  No  N/A        Please include the InterQual Review, InterQual year/version used, and the criteria selected:    Created Using Review Status Review Entered   Esteban Valle In Primary 7/21/2022 14:23       Criteria Set Name - Subset   LOC:Acute Adult-Withdrawal Syndrome      Criteria Review   REVIEW SUMMARY     InterQual® Review Status: In Primary  Criteria Status: Acute Met  Day of review: Episode Day 1  Condition Specific: Yes        REVIEW DETAILS     Product: Francisca Lopez Adult  Subset: Withdrawal Syndrome        (Symptom or finding within 24h)     (Excludes PO medications unless noted)         [X] Select Day, One:          [X] Episode Day 1, One:              [X] ACUTE, All:                  [X] Substance, >= One:                      [X] Anxiolytic withdrawal syndrome                  [X] Finding, >= One:                      [X] Myoclonic contractions or tremors                  [X] Intervention, >= One:                      [X] Medication assisted withdrawal management <= 4d, >= One:                          [X] Opiate antagonist (includes PO)        Version: InterQual® 2022, Apr 2022 Release  InterQual® criteria (IQ) is confidential and proprietary information and is being provided to you solely as it pertains to the information requested  IQ may contain advanced clinical knowledge which we recommend you discuss with your physician upon disclosure to you  Use permitted by and subject to license with [employer] and/or one of its [place]  IQ reflects clinical interpretations and analyses and cannot alone either (a) resolve medical ambiguities of particular situations; or (b) provide the sole basis for definitive decisions  IQ is intended solely for use as screening guidelines with respect to medical appropriateness of healthcare services  All ultimate care decisions are strictly and solely the obligation and responsibility of your health care provider  © 2022 [employer] and/or one of its subsidiaries  All Rights Reserved  CPT® only © 3244-8731 [employer]  All Rights Reserved  PLEASE SUBMIT THE COMPLETED FORM TO THE DEPARTMENT OF HUMAN SERVICES - DIVISION OF CLINICAL  REVIEW VIA FAX -[phone] or VIA E-MAIL TO [email or web address]    Signature: Brenda Pollock Date:  07/21/22    Confidentiality Notice: The documents accompanying this telecopy may contain confidential information belonging to the sender  The information is intended only for the use of the individual named above  If you are not the intended recipient, you are hereby notified  That any disclosure, copying, distribution or taking of any telecopy is strictly prohibited
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
## 2022-09-06 ENCOUNTER — LAB (OUTPATIENT)
Dept: LAB | Facility: HOSPITAL | Age: 42
End: 2022-09-06

## 2022-09-06 ENCOUNTER — OFFICE VISIT (OUTPATIENT)
Dept: FAMILY MEDICINE CLINIC | Facility: CLINIC | Age: 42
End: 2022-09-06

## 2022-09-06 VITALS
DIASTOLIC BLOOD PRESSURE: 70 MMHG | SYSTOLIC BLOOD PRESSURE: 116 MMHG | OXYGEN SATURATION: 98 % | HEART RATE: 74 BPM | HEIGHT: 64 IN | TEMPERATURE: 98 F | BODY MASS INDEX: 20.01 KG/M2 | WEIGHT: 117.2 LBS

## 2022-09-06 DIAGNOSIS — Z13.6 ENCOUNTER FOR SCREENING FOR CARDIOVASCULAR DISORDERS: ICD-10-CM

## 2022-09-06 DIAGNOSIS — M54.2 CHRONIC NECK PAIN: ICD-10-CM

## 2022-09-06 DIAGNOSIS — G43.801 OTHER MIGRAINE WITH STATUS MIGRAINOSUS, NOT INTRACTABLE: ICD-10-CM

## 2022-09-06 DIAGNOSIS — Z72.0 TOBACCO USER: ICD-10-CM

## 2022-09-06 DIAGNOSIS — R59.0 CERVICAL LYMPHADENOPATHY: ICD-10-CM

## 2022-09-06 DIAGNOSIS — Z13.1 ENCOUNTER FOR SCREENING FOR DIABETES MELLITUS: ICD-10-CM

## 2022-09-06 DIAGNOSIS — R53.83 OTHER FATIGUE: ICD-10-CM

## 2022-09-06 DIAGNOSIS — Z71.6 TOBACCO ABUSE COUNSELING: ICD-10-CM

## 2022-09-06 DIAGNOSIS — G25.81 RESTLESS LEG SYNDROME: ICD-10-CM

## 2022-09-06 DIAGNOSIS — E03.8 HYPOTHYROIDISM DUE TO HASHIMOTO'S THYROIDITIS: Primary | ICD-10-CM

## 2022-09-06 DIAGNOSIS — M54.9 CHRONIC MIDLINE BACK PAIN, UNSPECIFIED BACK LOCATION: ICD-10-CM

## 2022-09-06 DIAGNOSIS — G89.29 CHRONIC MIDLINE BACK PAIN, UNSPECIFIED BACK LOCATION: ICD-10-CM

## 2022-09-06 DIAGNOSIS — G89.29 CHRONIC NECK PAIN: ICD-10-CM

## 2022-09-06 DIAGNOSIS — Z12.31 SCREENING MAMMOGRAM, ENCOUNTER FOR: ICD-10-CM

## 2022-09-06 DIAGNOSIS — G43.109 MIGRAINE EQUIVALENT: ICD-10-CM

## 2022-09-06 DIAGNOSIS — Z12.4 ENCOUNTER FOR PAPANICOLAOU SMEAR OF CERVIX: ICD-10-CM

## 2022-09-06 DIAGNOSIS — E06.3 HYPOTHYROIDISM DUE TO HASHIMOTO'S THYROIDITIS: ICD-10-CM

## 2022-09-06 DIAGNOSIS — E06.3 HYPOTHYROIDISM DUE TO HASHIMOTO'S THYROIDITIS: Primary | ICD-10-CM

## 2022-09-06 DIAGNOSIS — E03.8 HYPOTHYROIDISM DUE TO HASHIMOTO'S THYROIDITIS: ICD-10-CM

## 2022-09-06 LAB
25(OH)D3 SERPL-MCNC: 37.1 NG/ML (ref 30–100)
ALBUMIN SERPL-MCNC: 4.8 G/DL (ref 3.5–5.2)
ALBUMIN/GLOB SERPL: 1.8 G/DL
ALP SERPL-CCNC: 53 U/L (ref 39–117)
ALT SERPL W P-5'-P-CCNC: 39 U/L (ref 1–33)
ANION GAP SERPL CALCULATED.3IONS-SCNC: 12 MMOL/L (ref 5–15)
AST SERPL-CCNC: 37 U/L (ref 1–32)
BASOPHILS # BLD AUTO: 0.05 10*3/MM3 (ref 0–0.2)
BASOPHILS NFR BLD AUTO: 0.5 % (ref 0–1.5)
BILIRUB SERPL-MCNC: 0.3 MG/DL (ref 0–1.2)
BUN SERPL-MCNC: 10 MG/DL (ref 6–20)
BUN/CREAT SERPL: 15.9 (ref 7–25)
CALCIUM SPEC-SCNC: 9.6 MG/DL (ref 8.6–10.5)
CHLORIDE SERPL-SCNC: 101 MMOL/L (ref 98–107)
CHOLEST SERPL-MCNC: 157 MG/DL (ref 0–200)
CO2 SERPL-SCNC: 22 MMOL/L (ref 22–29)
CREAT SERPL-MCNC: 0.63 MG/DL (ref 0.57–1)
DEPRECATED RDW RBC AUTO: 43.7 FL (ref 37–54)
EGFRCR SERPLBLD CKD-EPI 2021: 114.5 ML/MIN/1.73
EOSINOPHIL # BLD AUTO: 0 10*3/MM3 (ref 0–0.4)
EOSINOPHIL NFR BLD AUTO: 0 % (ref 0.3–6.2)
ERYTHROCYTE [DISTWIDTH] IN BLOOD BY AUTOMATED COUNT: 14.2 % (ref 12.3–15.4)
FERRITIN SERPL-MCNC: 98.8 NG/ML (ref 13–150)
GLOBULIN UR ELPH-MCNC: 2.6 GM/DL
GLUCOSE SERPL-MCNC: 83 MG/DL (ref 65–99)
HBA1C MFR BLD: 4.8 % (ref 4.8–5.6)
HCT VFR BLD AUTO: 40.5 % (ref 34–46.6)
HDLC SERPL-MCNC: 79 MG/DL (ref 40–60)
HGB BLD-MCNC: 13.5 G/DL (ref 12–15.9)
IMM GRANULOCYTES # BLD AUTO: 0.03 10*3/MM3 (ref 0–0.05)
IMM GRANULOCYTES NFR BLD AUTO: 0.3 % (ref 0–0.5)
IRON 24H UR-MRATE: 96 MCG/DL (ref 37–145)
IRON SATN MFR SERPL: 21 % (ref 20–50)
LDLC SERPL CALC-MCNC: 42 MG/DL (ref 0–100)
LDLC/HDLC SERPL: 0.38 {RATIO}
LYMPHOCYTES # BLD AUTO: 2.03 10*3/MM3 (ref 0.7–3.1)
LYMPHOCYTES NFR BLD AUTO: 19.8 % (ref 19.6–45.3)
MCH RBC QN AUTO: 28.6 PG (ref 26.6–33)
MCHC RBC AUTO-ENTMCNC: 33.3 G/DL (ref 31.5–35.7)
MCV RBC AUTO: 85.8 FL (ref 79–97)
MONOCYTES # BLD AUTO: 0.47 10*3/MM3 (ref 0.1–0.9)
MONOCYTES NFR BLD AUTO: 4.6 % (ref 5–12)
NEUTROPHILS NFR BLD AUTO: 7.67 10*3/MM3 (ref 1.7–7)
NEUTROPHILS NFR BLD AUTO: 74.8 % (ref 42.7–76)
NRBC BLD AUTO-RTO: 0 /100 WBC (ref 0–0.2)
PLATELET # BLD AUTO: 369 10*3/MM3 (ref 140–450)
PMV BLD AUTO: 10.1 FL (ref 6–12)
POTASSIUM SERPL-SCNC: 4.1 MMOL/L (ref 3.5–5.2)
PROT SERPL-MCNC: 7.4 G/DL (ref 6–8.5)
RBC # BLD AUTO: 4.72 10*6/MM3 (ref 3.77–5.28)
SODIUM SERPL-SCNC: 135 MMOL/L (ref 136–145)
T4 FREE SERPL-MCNC: 1.48 NG/DL (ref 0.93–1.7)
TIBC SERPL-MCNC: 462 MCG/DL (ref 298–536)
TRANSFERRIN SERPL-MCNC: 310 MG/DL (ref 200–360)
TRIGL SERPL-MCNC: 239 MG/DL (ref 0–150)
TSH SERPL DL<=0.05 MIU/L-ACNC: 0.35 UIU/ML (ref 0.27–4.2)
VIT B12 BLD-MCNC: 775 PG/ML (ref 211–946)
VLDLC SERPL-MCNC: 36 MG/DL (ref 5–40)
WBC NRBC COR # BLD: 10.25 10*3/MM3 (ref 3.4–10.8)

## 2022-09-06 PROCEDURE — 84439 ASSAY OF FREE THYROXINE: CPT

## 2022-09-06 PROCEDURE — 83540 ASSAY OF IRON: CPT

## 2022-09-06 PROCEDURE — 84443 ASSAY THYROID STIM HORMONE: CPT

## 2022-09-06 PROCEDURE — 85025 COMPLETE CBC W/AUTO DIFF WBC: CPT

## 2022-09-06 PROCEDURE — 82306 VITAMIN D 25 HYDROXY: CPT

## 2022-09-06 PROCEDURE — 99214 OFFICE O/P EST MOD 30 MIN: CPT | Performed by: FAMILY MEDICINE

## 2022-09-06 PROCEDURE — 83036 HEMOGLOBIN GLYCOSYLATED A1C: CPT

## 2022-09-06 PROCEDURE — 82728 ASSAY OF FERRITIN: CPT

## 2022-09-06 PROCEDURE — 82607 VITAMIN B-12: CPT

## 2022-09-06 PROCEDURE — 80061 LIPID PANEL: CPT

## 2022-09-06 PROCEDURE — 80053 COMPREHEN METABOLIC PANEL: CPT

## 2022-09-06 PROCEDURE — 84466 ASSAY OF TRANSFERRIN: CPT

## 2022-09-06 RX ORDER — BUTALBITAL, ACETAMINOPHEN AND CAFFEINE 50; 325; 40 MG/1; MG/1; MG/1
1 TABLET ORAL EVERY 6 HOURS PRN
Qty: 10 TABLET | Refills: 0 | Status: SHIPPED | OUTPATIENT
Start: 2022-09-06 | End: 2022-11-08 | Stop reason: SDUPTHER

## 2022-09-06 RX ORDER — ROPINIROLE 0.5 MG/1
0.5 TABLET, FILM COATED ORAL NIGHTLY
Qty: 30 TABLET | Refills: 3 | Status: SHIPPED | OUTPATIENT
Start: 2022-09-06

## 2022-09-06 RX ORDER — MELOXICAM 15 MG/1
15 TABLET ORAL DAILY
Qty: 30 TABLET | Refills: 3 | Status: SHIPPED | OUTPATIENT
Start: 2022-09-06 | End: 2022-11-08

## 2022-09-06 RX ORDER — TOPIRAMATE 50 MG/1
50 TABLET, FILM COATED ORAL 2 TIMES DAILY
Qty: 30 TABLET | Refills: 3 | Status: SHIPPED | OUTPATIENT
Start: 2022-09-06

## 2022-09-06 RX ORDER — NICOTINE 21 MG/24HR
1 PATCH, TRANSDERMAL 24 HOURS TRANSDERMAL EVERY 24 HOURS
Qty: 30 PATCH | Refills: 3 | Status: SHIPPED | OUTPATIENT
Start: 2022-09-06

## 2022-09-06 RX ORDER — LEVOTHYROXINE SODIUM 0.12 MG/1
125 TABLET ORAL DAILY
Qty: 30 TABLET | Refills: 3 | Status: SHIPPED | OUTPATIENT
Start: 2022-09-06

## 2022-09-06 RX ORDER — CYCLOBENZAPRINE HCL 5 MG
5 TABLET ORAL 3 TIMES DAILY PRN
Qty: 30 TABLET | Refills: 3 | Status: SHIPPED | OUTPATIENT
Start: 2022-09-06

## 2022-09-06 RX ORDER — RIZATRIPTAN BENZOATE 10 MG/1
TABLET ORAL
Qty: 10 TABLET | Refills: 3 | Status: SHIPPED | OUTPATIENT
Start: 2022-09-06

## 2022-09-06 NOTE — PATIENT INSTRUCTIONS
New medication    Topamax 50 mg twice a day for headaches    Maxalt 10 mg on onset of headache can repeat in 2 hours if needed    For sleep MIDNITE Melatonin gummies. Can get at [employer] for 8 dollars    Will get US of neck for neck swelling     Get labwork    Will get mammogram    Will refer to OB/GYN

## 2022-09-08 ENCOUNTER — TELEPHONE (OUTPATIENT)
Dept: FAMILY MEDICINE CLINIC | Facility: CLINIC | Age: 42
End: 2022-09-08

## 2022-09-08 DIAGNOSIS — R74.8 ELEVATED LIVER ENZYMES: Primary | ICD-10-CM

## 2022-09-08 NOTE — TELEPHONE ENCOUNTER
----- Message from Rashad Rondon MD sent at 9/7/2022  9:51 AM CDT -----  Please call pt    On CMP kidney function stable but liver enzymes elevated. Recommend US of liver at imaging center. Concerned for possible fatty liver if pt agreeable I will order     Thyroid studies normal    On lipid panel triglycerides high. In addition to heart healthy diet recommend pt start on fish oil supplement with omega 3 fatty acids cant get OTC     Rest of labwork stable    Recheck on next visit thanks

## 2022-09-20 DIAGNOSIS — R59.0 CERVICAL LYMPHADENOPATHY: ICD-10-CM

## 2022-09-20 DIAGNOSIS — R74.8 ELEVATED LIVER ENZYMES: ICD-10-CM

## 2022-09-20 DIAGNOSIS — Z12.31 SCREENING MAMMOGRAM, ENCOUNTER FOR: ICD-10-CM

## 2022-09-26 ENCOUNTER — TELEPHONE (OUTPATIENT)
Dept: FAMILY MEDICINE CLINIC | Facility: CLINIC | Age: 42
End: 2022-09-26

## 2022-09-26 NOTE — TELEPHONE ENCOUNTER
----- Message from Rashad Rondon MD sent at 9/26/2022  4:54 AM CDT -----  Please let pt know that US of thyroid/neck on 9/20/22 showed heterogeneous thyroid gland with no suspicious masses. There was presence of lymph nodes and if there is concern about any swelling or masses in neck A CT may be needed. Will recheck on next visit thanks

## 2022-10-03 ENCOUNTER — OFFICE VISIT (OUTPATIENT)
Dept: FAMILY MEDICINE CLINIC | Facility: CLINIC | Age: 42
End: 2022-10-03

## 2022-10-03 ENCOUNTER — LAB (OUTPATIENT)
Dept: LAB | Facility: HOSPITAL | Age: 42
End: 2022-10-03

## 2022-10-03 VITALS
BODY MASS INDEX: 19.81 KG/M2 | HEIGHT: 64 IN | HEART RATE: 114 BPM | SYSTOLIC BLOOD PRESSURE: 130 MMHG | DIASTOLIC BLOOD PRESSURE: 70 MMHG | OXYGEN SATURATION: 99 % | TEMPERATURE: 99.1 F | WEIGHT: 116 LBS

## 2022-10-03 DIAGNOSIS — R21 RASH AND NONSPECIFIC SKIN ERUPTION: ICD-10-CM

## 2022-10-03 DIAGNOSIS — F43.9 STRESS: ICD-10-CM

## 2022-10-03 DIAGNOSIS — B19.20 HEPATITIS C TEST POSITIVE: ICD-10-CM

## 2022-10-03 DIAGNOSIS — N89.8 VAGINAL ODOR: ICD-10-CM

## 2022-10-03 DIAGNOSIS — F41.9 ANXIETY: ICD-10-CM

## 2022-10-03 DIAGNOSIS — R21 RASH AND NONSPECIFIC SKIN ERUPTION: Primary | ICD-10-CM

## 2022-10-03 LAB
BILIRUB BLD-MCNC: ABNORMAL MG/DL
CLARITY, POC: ABNORMAL
COLOR UR: ABNORMAL
EXPIRATION DATE: ABNORMAL
GLUCOSE UR STRIP-MCNC: NEGATIVE MG/DL
KETONES UR QL: ABNORMAL
LEUKOCYTE EST, POC: ABNORMAL
Lab: ABNORMAL
NITRITE UR-MCNC: NEGATIVE MG/ML
PH UR: 5.5 [PH] (ref 5–8)
PROT UR STRIP-MCNC: ABNORMAL MG/DL
RBC # UR STRIP: ABNORMAL /UL
SP GR UR: 1.03 (ref 1–1.03)
UROBILINOGEN UR QL: NORMAL

## 2022-10-03 PROCEDURE — 87591 N.GONORRHOEAE DNA AMP PROB: CPT | Performed by: NURSE PRACTITIONER

## 2022-10-03 PROCEDURE — 87086 URINE CULTURE/COLONY COUNT: CPT | Performed by: NURSE PRACTITIONER

## 2022-10-03 PROCEDURE — 80074 ACUTE HEPATITIS PANEL: CPT

## 2022-10-03 PROCEDURE — 86803 HEPATITIS C AB TEST: CPT

## 2022-10-03 PROCEDURE — 86592 SYPHILIS TEST NON-TREP QUAL: CPT

## 2022-10-03 PROCEDURE — 87252 VIRUS INOCULATION TISSUE: CPT | Performed by: NURSE PRACTITIONER

## 2022-10-03 PROCEDURE — 87070 CULTURE OTHR SPECIMN AEROBIC: CPT | Performed by: NURSE PRACTITIONER

## 2022-10-03 PROCEDURE — 87660 TRICHOMONAS VAGIN DIR PROBE: CPT | Performed by: NURSE PRACTITIONER

## 2022-10-03 PROCEDURE — 86696 HERPES SIMPLEX TYPE 2 TEST: CPT | Performed by: NURSE PRACTITIONER

## 2022-10-03 PROCEDURE — 87491 CHLMYD TRACH DNA AMP PROBE: CPT | Performed by: NURSE PRACTITIONER

## 2022-10-03 PROCEDURE — 87147 CULTURE TYPE IMMUNOLOGIC: CPT | Performed by: NURSE PRACTITIONER

## 2022-10-03 PROCEDURE — 87205 SMEAR GRAM STAIN: CPT | Performed by: NURSE PRACTITIONER

## 2022-10-03 PROCEDURE — G0432 EIA HIV-1/HIV-2 SCREEN: HCPCS

## 2022-10-03 PROCEDURE — 86695 HERPES SIMPLEX TYPE 1 TEST: CPT | Performed by: NURSE PRACTITIONER

## 2022-10-03 PROCEDURE — 87510 GARDNER VAG DNA DIR PROBE: CPT | Performed by: NURSE PRACTITIONER

## 2022-10-03 PROCEDURE — 87661 TRICHOMONAS VAGINALIS AMPLIF: CPT | Performed by: NURSE PRACTITIONER

## 2022-10-03 PROCEDURE — 87480 CANDIDA DNA DIR PROBE: CPT | Performed by: NURSE PRACTITIONER

## 2022-10-03 PROCEDURE — 99214 OFFICE O/P EST MOD 30 MIN: CPT | Performed by: NURSE PRACTITIONER

## 2022-10-03 RX ORDER — HYDROXYZINE HYDROCHLORIDE 25 MG/1
TABLET, FILM COATED ORAL
Qty: 90 TABLET | Refills: 0 | Status: SHIPPED | OUTPATIENT
Start: 2022-10-03
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
## 2022-10-04 LAB
CANDIDA ALBICANS: NEGATIVE
GARDNERELLA VAGINALIS: POSITIVE
HAV IGM SERPL QL IA: ABNORMAL
HBV CORE IGM SERPL QL IA: ABNORMAL
HBV SURFACE AG SERPL QL IA: ABNORMAL
HCV AB SER DONR QL: REACTIVE
HIV1+2 AB SER QL: NORMAL
RPR SER QL: NORMAL
T VAGINALIS DNA VAG QL PROBE+SIG AMP: NEGATIVE

## 2022-10-05 DIAGNOSIS — A54.9 GONORRHEA: Primary | ICD-10-CM

## 2022-10-05 DIAGNOSIS — B19.20 HEPATITIS C TEST POSITIVE: Primary | ICD-10-CM

## 2022-10-05 DIAGNOSIS — N76.0 BV (BACTERIAL VAGINOSIS): ICD-10-CM

## 2022-10-05 DIAGNOSIS — B96.89 BV (BACTERIAL VAGINOSIS): ICD-10-CM

## 2022-10-05 DIAGNOSIS — B95.1 GROUP B STREPTOCOCCAL UTI: ICD-10-CM

## 2022-10-05 DIAGNOSIS — N39.0 GROUP B STREPTOCOCCAL UTI: ICD-10-CM

## 2022-10-05 DIAGNOSIS — B00.9 HERPES SIMPLEX: ICD-10-CM

## 2022-10-05 LAB
BACTERIA SPEC AEROBE CULT: ABNORMAL
C TRACH RRNA CVX QL NAA+PROBE: NEGATIVE
HCV AB SER DONR QL: REACTIVE
HSV1 IGG SER IA-ACNC: 13.7 INDEX (ref 0–0.9)
HSV2 IGG SER IA-ACNC: <0.91 INDEX (ref 0–0.9)
N GONORRHOEA RRNA SPEC QL NAA+PROBE: POSITIVE
TRICHOMONAS VAGINALIS PCR: NEGATIVE

## 2022-10-05 RX ORDER — CEFTRIAXONE 500 MG/1
500 INJECTION, POWDER, FOR SOLUTION INTRAMUSCULAR; INTRAVENOUS ONCE
Status: COMPLETED | OUTPATIENT
Start: 2022-10-05 | End: 2022-10-07

## 2022-10-05 RX ORDER — PENICILLIN V POTASSIUM 500 MG/1
500 TABLET ORAL 2 TIMES DAILY
Qty: 14 TABLET | Refills: 0 | Status: SHIPPED | OUTPATIENT
Start: 2022-10-05 | End: 2022-10-12

## 2022-10-05 RX ORDER — FLUCONAZOLE 150 MG/1
TABLET ORAL
Qty: 2 TABLET | Refills: 0 | Status: SHIPPED | OUTPATIENT
Start: 2022-10-05 | End: 2022-11-08

## 2022-10-05 RX ORDER — ACYCLOVIR 200 MG/1
200 CAPSULE ORAL
Qty: 25 CAPSULE | Refills: 2 | Status: SHIPPED | OUTPATIENT
Start: 2022-10-05 | End: 2022-10-10

## 2022-10-07 ENCOUNTER — OFFICE VISIT (OUTPATIENT)
Dept: FAMILY MEDICINE CLINIC | Facility: CLINIC | Age: 42
End: 2022-10-07

## 2022-10-07 ENCOUNTER — LAB (OUTPATIENT)
Dept: LAB | Facility: HOSPITAL | Age: 42
End: 2022-10-07

## 2022-10-07 VITALS
HEART RATE: 108 BPM | TEMPERATURE: 99.1 F | WEIGHT: 115 LBS | DIASTOLIC BLOOD PRESSURE: 60 MMHG | SYSTOLIC BLOOD PRESSURE: 100 MMHG | HEIGHT: 64 IN | BODY MASS INDEX: 19.63 KG/M2 | OXYGEN SATURATION: 100 %

## 2022-10-07 DIAGNOSIS — A54.9 GONORRHEA: ICD-10-CM

## 2022-10-07 DIAGNOSIS — B00.9 HERPES SIMPLEX TYPE 1 ANTIBODY POSITIVE: ICD-10-CM

## 2022-10-07 DIAGNOSIS — R10.9 ABDOMINAL CRAMPING: ICD-10-CM

## 2022-10-07 DIAGNOSIS — M54.2 CHRONIC NECK PAIN: Primary | Chronic | ICD-10-CM

## 2022-10-07 DIAGNOSIS — B19.20 HEPATITIS C TEST POSITIVE: ICD-10-CM

## 2022-10-07 DIAGNOSIS — G89.29 CHRONIC NECK PAIN: Primary | Chronic | ICD-10-CM

## 2022-10-07 DIAGNOSIS — R20.0 NUMBNESS OF FINGERS: ICD-10-CM

## 2022-10-07 DIAGNOSIS — N76.0 BV (BACTERIAL VAGINOSIS): ICD-10-CM

## 2022-10-07 DIAGNOSIS — N39.0 GROUP B STREPTOCOCCAL UTI: ICD-10-CM

## 2022-10-07 DIAGNOSIS — B95.1 GROUP B STREPTOCOCCAL UTI: ICD-10-CM

## 2022-10-07 DIAGNOSIS — B96.89 BV (BACTERIAL VAGINOSIS): ICD-10-CM

## 2022-10-07 DIAGNOSIS — R76.8 POSITIVE HEPATITIS C ANTIBODY TEST: ICD-10-CM

## 2022-10-07 LAB
BACTERIA SPEC AEROBE CULT: NORMAL
GRAM STN SPEC: NORMAL
GRAM STN SPEC: NORMAL

## 2022-10-07 PROCEDURE — 99214 OFFICE O/P EST MOD 30 MIN: CPT | Performed by: NURSE PRACTITIONER

## 2022-10-07 PROCEDURE — 96372 THER/PROPH/DIAG INJ SC/IM: CPT | Performed by: NURSE PRACTITIONER

## 2022-10-07 PROCEDURE — 87522 HEPATITIS C REVRS TRNSCRPJ: CPT

## 2022-10-07 RX ORDER — BETAMETHASONE SODIUM PHOSPHATE AND BETAMETHASONE ACETATE 3; 3 MG/ML; MG/ML
9 INJECTION, SUSPENSION INTRA-ARTICULAR; INTRALESIONAL; INTRAMUSCULAR; SOFT TISSUE ONCE
Status: COMPLETED | OUTPATIENT
Start: 2022-10-07 | End: 2022-10-07

## 2022-10-07 RX ORDER — DICYCLOMINE HYDROCHLORIDE 10 MG/1
10 CAPSULE ORAL
Qty: 20 CAPSULE | Refills: 0 | Status: SHIPPED | OUTPATIENT
Start: 2022-10-07

## 2022-10-07 RX ADMIN — CEFTRIAXONE 500 MG: 500 INJECTION, POWDER, FOR SOLUTION INTRAMUSCULAR; INTRAVENOUS at 14:47

## 2022-10-07 RX ADMIN — BETAMETHASONE SODIUM PHOSPHATE AND BETAMETHASONE ACETATE 9 MG: 3; 3 INJECTION, SUSPENSION INTRA-ARTICULAR; INTRALESIONAL; INTRAMUSCULAR; SOFT TISSUE at 14:49

## 2022-10-07 NOTE — PROGRESS NOTES
"Chief Complaint  Follow-up (Lab results), Upper Extremity Issue (3 finger rt hand), and Pain (Neck pain X 2 days)    Subjective          Lester Lozano presents to [facility] [place]    History of Present Illness  [facility]    PCP: Dr. Rondon--appt next month    CC: \"discuss recent lab results; stomach cramps and diarrhea; neck pain and numbness in fingers\"    Here to discuss recent labs/cultures that were collected on 10-3-2022.  Lab results/notes/treatments discussed under laboratory review to include:  She was + for strep B UTI--Rx for PCN has been sent; +GC, received Rocephin injection today, [id number] form completed; HSV1 +, rx for acyclovir for future outbreaks provided; +BV, rx for metrogel provided.  HCV antibody +, non reactive in 2021, will get additional labs for confirmation today.     C/O chronic neck pain, hx of DDD on previous xrays, having worsening pain recently and now having numbness/tingling in 2-4th fingers on right hand.  Denies any new injury.  Taking Meloxicam and flexeril with minimal relief.      C/O abdominal cramping and off/on diarrhea for the last few days.  Decreased appetite.  Has been under increased stress.  Denies blood in stool.  No unexplained weight loss has been noted.  No n/v, GERD.          Review of Systems   Constitutional: Positive for appetite change and fatigue. Negative for fever.   HENT: Negative.    Eyes: Negative.    Respiratory: Negative.    Cardiovascular: Negative.    Gastrointestinal: Positive for abdominal pain (cramping) and diarrhea (on/off). Negative for blood in stool, nausea and vomiting.        Denies GERD   Genitourinary: Positive for frequency, urgency and vaginal discharge. Negative for dysuria.   Musculoskeletal: Positive for neck pain.   Skin: Positive for rash (previous rash is improving, only a few open areas remain on face).   Neurological: Positive for numbness (right " "fingers--2-4). Negative for dizziness and headaches.   Psychiatric/Behavioral: The patient is nervous/anxious.         +stress          Objective   Vital Signs:   /60 (BP Location: Right arm, Patient Position: Sitting)   Pulse 108   Temp 99.1 °F (37.3 °C)   Ht 162.6 cm (64\")   Wt 52.2 kg (115 lb)   SpO2 100%   BMI 19.74 kg/m²       Physical Exam  Vitals and nursing note reviewed.   Constitutional:       General: She is not in acute distress.     Appearance: She is not ill-appearing.   HENT:      Head: Normocephalic and atraumatic.   Cardiovascular:      Rate and Rhythm: Regular rhythm. Tachycardia present.   Pulmonary:      Effort: Pulmonary effort is normal. No respiratory distress.      Breath sounds: Normal breath sounds. No wheezing, rhonchi or rales.   Abdominal:      General: Bowel sounds are normal.      Palpations: Abdomen is soft.      Tenderness: There is abdominal tenderness ( mild generalized, suprapubic pressure). There is no right CVA tenderness, left CVA tenderness, guarding or rebound.   Musculoskeletal:         General: Tenderness present.      Cervical back: Neck supple. Tenderness present. No rigidity. Decreased range of motion.      Comments: Normal/equal  upper extremities bilaterally   Skin:     General: Skin is warm and dry.   Neurological:      General: No focal deficit present.      Mental Status: She is alert and oriented to person, place, and time.   Psychiatric:         Attention and Perception: Attention normal.         Mood and Affect: Mood normal.         Speech: Speech normal.         Behavior: Behavior is cooperative.         Thought Content: Thought content normal.         Cognition and Memory: Cognition normal.          Result Review :              Recent Results (from the past 500 hour(s))   Chlamydia trachomatis, Neisseria gonorrhoeae, Trichomonas vaginalis, PCR - Swab, Vagina    Collection Time: 10/03/22  1:58 PM    Specimen: Vagina; Swab   Result Value Ref " Range    Chlamydia DNA by PCR Negative Negative    Neisseria gonorrhoeae by PCR Positive (A) Negative    Trichomonas vaginalis PCR Negative Negative, Invalid   Gardnerella vaginalis, Trichomonas vaginalis, Candida albicans, DNA - Swab, Vagina    Collection Time: 10/03/22  1:58 PM    Specimen: Vagina; Swab   Result Value Ref Range    CANDIDA ALBICANS Negative Negative    GARDNERELLA VAGINALIS Positive (A) Negative    TRICHOMONAS VAGINALIS Negative Negative   Virus Culture - Swab, Lip, Upper    Collection Time: 10/03/22  1:58 PM    Specimen: Lip, Upper; Swab   Result Value Ref Range    Viral Culture, General Comment    Wound Culture - Wound, Neck    Collection Time: 10/03/22  1:58 PM    Specimen: Neck; Wound   Result Value Ref Range    Wound Culture No growth at 3 days     Gram Stain Rare (1+) WBCs seen     Gram Stain No organisms seen    HSV 1 & 2 - Specific Antibody, IgG    Collection Time: 10/03/22  2:15 PM    Specimen: Blood   Result Value Ref Range    HSV 1 IgG, Type Specific 13.70 (H) 0.00 - 0.90 index    HSV 2 IgG <0.91 0.00 - 0.90 index   Urine Culture - Urine, Urine, Clean Catch    Collection Time: 10/03/22  2:15 PM    Specimen: Urine, Clean Catch   Result Value Ref Range    Urine Culture <25,000 CFU/mL Streptococcus agalactiae (Group B) (A)    HIV-1/O/2 Ag/Ab w Reflex    Collection Time: 10/03/22  2:16 PM    Specimen: Blood   Result Value Ref Range    HIV-1/ HIV-2 Non-Reactive Non-Reactive   Hepatitis panel, acute    Collection Time: 10/03/22  2:16 PM    Specimen: Blood   Result Value Ref Range    Hepatitis B Surface Ag Non-Reactive Non-Reactive    Hep A IgM Non-Reactive Non-Reactive    Hep B C IgM Non-Reactive Non-Reactive    Hepatitis C Ab Reactive (A) Non-Reactive   RPR    Collection Time: 10/03/22  2:16 PM    Specimen: Blood   Result Value Ref Range    RPR Non-Reactive Non-Reactive   Hepatitis C antibody    Collection Time: 10/03/22  2:16 PM    Specimen: Blood   Result Value Ref Range    Hepatitis C Ab  Reactive (A) Non-Reactive   POCT urinalysis dipstick, automated    Collection Time: 10/03/22  2:32 PM    Specimen: Urine   Result Value Ref Range    Color Va Yellow, Straw, Dark Yellow, Va    Clarity, UA Cloudy (A) Clear    Specific Gravity  1.030 1.005 - 1.030    pH, Urine 5.5 5.0 - 8.0    Leukocytes Small (1+) (A) Negative    Nitrite, UA Negative Negative    Protein, POC 1+ (A) Negative mg/dL    Glucose, UA Negative Negative mg/dL    Ketones, UA 3+ (A) Negative    Urobilinogen, UA Normal Normal, 0.2 E.U./dL    Bilirubin Small (1+) (A) Negative    Blood, UA 3+ (A) Negative    Lot Number [id number]     Expiration Date 05/10/2023           Assessment and Plan    Diagnoses and all orders for this visit:    1. Chronic neck pain (Primary)  -     betamethasone acetate-betamethasone sodium phosphate (CELESTONE SOLUSPAN) injection 9 mg    2. Abdominal cramping  -     dicyclomine (Bentyl) 10 MG capsule; Take 1 capsule by mouth 4 (Four) Times a Day Before Meals & at Bedtime.  Dispense: 20 capsule; Refill: 0    3. Numbness of fingers  Comments:  right--2nd, 3rd, 4th      4. Herpes simplex type 1 antibody positive    5. Gonorrhea    6. BV (bacterial vaginosis)    7. Group B streptococcal UTI    8. Positive hepatitis C antibody test      All recent labs discussed/questions answered--treatment has already been ordered and sent for + results. Rocephin that was previously ordered, given in office today.     Confirmatory testing for HCV today--referral to Gastro if +    Rx for dicyclomine for abdominal cramping.  [place], BRAT diet until normal BM's.     Celestone 9 mg IM x 1 in office today for neck pain.   If neck pain and numbness persists, may need MRI.    Has f/u with PCP on 11/8/2022      This document has been electronically signed by BEATRIZ Menjivar on October 7, 2022 17:12 CDT,.    I spent 30 minutes caring for Lester on this date of service. This time includes time spent by me in the following  activities:preparing for the visit, reviewing tests, performing a medically appropriate examination and/or evaluation , counseling and educating the patient/family/caregiver, ordering medications, tests, or procedures and documenting information in the medical record

## 2022-10-10 LAB
HCV RNA SERPL NAA+PROBE-ACNC: NORMAL IU/ML
HCV RNA SERPL NAA+PROBE-LOG IU: 4.9 LOG10 IU/ML
TEST INFORMATION: NORMAL

## 2022-10-11 DIAGNOSIS — R89.4 POSITIVE TEST FOR HERPES SIMPLEX VIRUS (HSV) ANTIBODY: Primary | ICD-10-CM

## 2022-10-13 LAB — VIRUS SPEC CULT: NORMAL

## 2022-10-17 DIAGNOSIS — R76.8 HCV ANTIBODY POSITIVE: Primary | ICD-10-CM

## 2022-11-08 ENCOUNTER — OFFICE VISIT (OUTPATIENT)
Dept: FAMILY MEDICINE CLINIC | Facility: CLINIC | Age: 42
End: 2022-11-08

## 2022-11-08 VITALS
HEIGHT: 64 IN | SYSTOLIC BLOOD PRESSURE: 102 MMHG | OXYGEN SATURATION: 98 % | TEMPERATURE: 98.2 F | BODY MASS INDEX: 20.59 KG/M2 | DIASTOLIC BLOOD PRESSURE: 64 MMHG | WEIGHT: 120.6 LBS | HEART RATE: 112 BPM

## 2022-11-08 DIAGNOSIS — G43.801 OTHER MIGRAINE WITH STATUS MIGRAINOSUS, NOT INTRACTABLE: ICD-10-CM

## 2022-11-08 DIAGNOSIS — G89.29 CHRONIC MIDLINE BACK PAIN, UNSPECIFIED BACK LOCATION: Primary | ICD-10-CM

## 2022-11-08 DIAGNOSIS — Z86.19 HISTORY OF GONORRHEA: ICD-10-CM

## 2022-11-08 DIAGNOSIS — B18.2 CHRONIC ACTIVE HEPATITIS C: ICD-10-CM

## 2022-11-08 DIAGNOSIS — B00.9 HSV-1 INFECTION: ICD-10-CM

## 2022-11-08 DIAGNOSIS — B96.89 BACTERIAL VAGINITIS: ICD-10-CM

## 2022-11-08 DIAGNOSIS — Z72.0 TOBACCO USER: ICD-10-CM

## 2022-11-08 DIAGNOSIS — Z87.828 HISTORY OF MOTOR VEHICLE ACCIDENT: ICD-10-CM

## 2022-11-08 DIAGNOSIS — G43.109 MIGRAINE EQUIVALENT: ICD-10-CM

## 2022-11-08 DIAGNOSIS — G89.29 CHRONIC NECK PAIN: ICD-10-CM

## 2022-11-08 DIAGNOSIS — Z12.31 SCREENING MAMMOGRAM, ENCOUNTER FOR: ICD-10-CM

## 2022-11-08 DIAGNOSIS — R74.8 ELEVATED LIVER ENZYMES: ICD-10-CM

## 2022-11-08 DIAGNOSIS — M54.2 CHRONIC NECK PAIN: ICD-10-CM

## 2022-11-08 DIAGNOSIS — E03.8 HYPOTHYROIDISM DUE TO HASHIMOTO'S THYROIDITIS: ICD-10-CM

## 2022-11-08 DIAGNOSIS — M54.9 CHRONIC MIDLINE BACK PAIN, UNSPECIFIED BACK LOCATION: Primary | ICD-10-CM

## 2022-11-08 DIAGNOSIS — N76.0 BACTERIAL VAGINITIS: ICD-10-CM

## 2022-11-08 DIAGNOSIS — E06.3 HYPOTHYROIDISM DUE TO HASHIMOTO'S THYROIDITIS: ICD-10-CM

## 2022-11-08 DIAGNOSIS — G47.00 INSOMNIA, UNSPECIFIED TYPE: ICD-10-CM

## 2022-11-08 PROCEDURE — 99214 OFFICE O/P EST MOD 30 MIN: CPT | Performed by: FAMILY MEDICINE

## 2022-11-08 RX ORDER — FREMANEZUMAB-VFRM 225 MG/1.5ML
225 INJECTION SUBCUTANEOUS
Qty: 1.68 ML | Refills: 3 | Status: SHIPPED | OUTPATIENT
Start: 2022-11-08

## 2022-11-08 RX ORDER — TRAZODONE HYDROCHLORIDE 50 MG/1
50 TABLET ORAL NIGHTLY
Qty: 30 TABLET | Refills: 3 | Status: SHIPPED | OUTPATIENT
Start: 2022-11-08

## 2022-11-08 RX ORDER — DULOXETIN HYDROCHLORIDE 30 MG/1
CAPSULE, DELAYED RELEASE ORAL
Qty: 60 CAPSULE | Refills: 3 | Status: SHIPPED | OUTPATIENT
Start: 2022-11-08

## 2022-11-08 RX ORDER — BUTALBITAL, ACETAMINOPHEN AND CAFFEINE 50; 325; 40 MG/1; MG/1; MG/1
1 TABLET ORAL EVERY 6 HOURS PRN
Qty: 10 TABLET | Refills: 0 | Status: SHIPPED | OUTPATIENT
Start: 2022-11-08

## 2022-11-08 NOTE — PATIENT INSTRUCTIONS
Things to do    Call [facility] for an appt regarding hep C infection    New mediations    For back/neck pain- cymbalta 30 mg daily for 1st week then twice a day for following weeks    For sleep. Trazodone 50 mg at bedtime     For migraine  ajovy injections once a month.  Call if not covered by insurance

## 2022-11-11 ENCOUNTER — TELEPHONE (OUTPATIENT)
Dept: FAMILY MEDICINE CLINIC | Facility: CLINIC | Age: 42
End: 2022-11-11

## 2022-11-11 NOTE — TELEPHONE ENCOUNTER
They are willing to do a peer to peer if you want to call them at [phone] ext [phone], Must be able to be the level of service that can be safely furnished and for which no equally effective and more conservative or less costly treatment is available statewide. Will have denial scanned in chart

## 2022-11-14 RX ORDER — BENZONATATE 100 MG/1
100 CAPSULE ORAL 3 TIMES DAILY PRN
Qty: 30 CAPSULE | Refills: 1 | Status: SHIPPED | OUTPATIENT
Start: 2022-11-14

## 2022-11-14 NOTE — TELEPHONE ENCOUNTER
PATIENT CALLED AND STATED HAS COUGH AND A LOT OF PHLEGM, PLUS DRAINAGE. SHE WOULD LIKE SOMETHING CALLED IN TO [facility]

## 2022-11-14 NOTE — TELEPHONE ENCOUNTER
Rashad Rondon MD  You 2 hours ago (11:35 AM)     Recommend pt start on tessalon perles 100 mg PO TID. Give 30 pills and 1 refill thanks       Made pt aware and she is agreeable.

## 2022-11-28 NOTE — TELEPHONE ENCOUNTER
I checked with patients insurance she has to have tried emgality,aimovig,and nurtec and failed first
